# Patient Record
Sex: MALE | Race: WHITE | NOT HISPANIC OR LATINO
[De-identification: names, ages, dates, MRNs, and addresses within clinical notes are randomized per-mention and may not be internally consistent; named-entity substitution may affect disease eponyms.]

---
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
## 2024-02-01 ENCOUNTER — OFFICE VISIT (OUTPATIENT)
Age: 19
End: 2024-02-01
Payer: COMMERCIAL

## 2024-02-01 VITALS
BODY MASS INDEX: 28.08 KG/M2 | OXYGEN SATURATION: 99 % | RESPIRATION RATE: 16 BRPM | DIASTOLIC BLOOD PRESSURE: 72 MMHG | HEART RATE: 76 BPM | WEIGHT: 200.6 LBS | SYSTOLIC BLOOD PRESSURE: 120 MMHG | TEMPERATURE: 98 F | HEIGHT: 71 IN

## 2024-02-01 DIAGNOSIS — L73.9 FOLLICULITIS: Primary | ICD-10-CM

## 2024-02-01 PROCEDURE — 99213 OFFICE O/P EST LOW 20 MIN: CPT | Performed by: INTERNAL MEDICINE

## 2024-02-01 RX ORDER — LAMOTRIGINE 25 MG/1
TABLET ORAL
COMMUNITY
Start: 2024-01-30 | End: 2024-02-01

## 2024-02-01 RX ORDER — SULFAMETHOXAZOLE AND TRIMETHOPRIM 800; 160 MG/1; MG/1
1 TABLET ORAL 2 TIMES DAILY
Qty: 20 TABLET | Refills: 0 | Status: SHIPPED | OUTPATIENT
Start: 2024-02-01 | End: 2024-02-11

## 2024-02-01 NOTE — PROGRESS NOTES
INTERNAL MEDICINE FOLLOW-UP OFFICE VISIT  [facility]    NAME: Dave Estrada  AGE: 18 y.o. SEX: male  : 2005   MRN: [id number]    DATE: 2024  TIME: 4:52 PM    Assessment and Plan     1. Folliculitis  Was advised to take ibuprofen or Tylenol for pain relief.  He will call me back if his symptoms do not improve.    - sulfamethoxazole-trimethoprim (BACTRIM DS) 800-160 mg per tablet; Take 1 tablet by mouth 2 (two) times a day for 10 days  Dispense: 20 tablet; Refill: 0    - Counseling Documentation: patient was counseled regarding: instructions for management, risk factor reductions, prognosis, patient and family education, risks and benefits of treatment options, and importance of compliance with treatment  - Medication Side Effects: Adverse side effects of medications were reviewed with the patient/guardian today.    Return to office in: As needed    Chief Complaint     Chief Complaint   Patient presents with    Recurrent Skin Infections     After shaving         History of Present Illness     HPI  Has a boil in his groin area for the last few days.  It happened after shaving.  The size of the boil is increasing.  There is some pain in it.    The following portions of the patient's history were reviewed and updated as appropriate: allergies, current medications, past family history, past medical history, past social history, past surgical history and problem list.    Review of Systems     Review of Systems   Constitutional:  Negative for chills, diaphoresis, fatigue and fever.   HENT:  Negative for congestion, ear discharge, ear pain, hearing loss, postnasal drip, rhinorrhea, sinus pressure, sinus pain, sneezing, sore throat and voice change.    Eyes:  Negative for pain, discharge, redness and visual disturbance.   Respiratory:  Negative for cough, chest tightness, shortness of breath and wheezing.    Cardiovascular:  Negative for chest pain, palpitations and leg  "swelling.   Gastrointestinal:  Negative for abdominal distention, abdominal pain, blood in stool, constipation, diarrhea, nausea and vomiting.   Endocrine: Negative for cold intolerance, heat intolerance, polydipsia, polyphagia and polyuria.   Genitourinary:  Negative for dysuria, flank pain, frequency, hematuria and urgency.   Musculoskeletal:  Negative for arthralgias, back pain, gait problem, joint swelling, myalgias, neck pain and neck stiffness.   Skin:  Positive for wound. Negative for rash.   Neurological:  Negative for dizziness, tremors, syncope, facial asymmetry, speech difficulty, weakness, light-headedness, numbness and headaches.   Hematological:  Does not bruise/bleed easily.   Psychiatric/Behavioral:  Negative for behavioral problems, confusion and sleep disturbance. The patient is not nervous/anxious.        Active Problem List     Patient Active Problem List   Diagnosis    Recurrent major depressive disorder, in partial remission (HCC)    Generalized anxiety disorder    PTSD (post-traumatic stress disorder)    Overweight    SOB (shortness of breath) on exertion    Coccydynia    Low left ventricular ejection fraction       Objective     /72 (BP Location: Left arm, Patient Position: Sitting, Cuff Size: Standard)   Pulse 76   Temp 98 °F (36.7 °C) (Tympanic)   Resp 16   Ht 5' 11\" (1.803 m)   Wt 91 kg (200 lb 9.6 oz)   SpO2 99%   BMI 27.98 kg/m²     Physical Exam  Constitutional:       General: He is not in acute distress.     Appearance: He is well-developed. He is not diaphoretic.   HENT:      Head: Normocephalic and atraumatic.      Right Ear: External ear normal.      Left Ear: External ear normal.      Nose: Nose normal.   Eyes:      General: No scleral icterus.        Right eye: No discharge.         Left eye: No discharge.      Conjunctiva/sclera: Conjunctivae normal.   Neck:      Thyroid: No thyromegaly.      Vascular: No JVD.      Trachea: No tracheal deviation.   Cardiovascular:    "   Rate and Rhythm: Normal rate and regular rhythm.      Heart sounds: Normal heart sounds. No murmur heard.     No friction rub. No gallop.   Pulmonary:      Effort: Pulmonary effort is normal. No respiratory distress.      Breath sounds: Normal breath sounds. No wheezing or rales.   Chest:      Chest wall: No tenderness.   Abdominal:      General: Bowel sounds are normal. There is no distension.      Palpations: Abdomen is soft.      Tenderness: There is no abdominal tenderness. There is no guarding or rebound.   Musculoskeletal:         General: No tenderness. Normal range of motion.      Cervical back: Normal range of motion and neck supple.   Lymphadenopathy:      Cervical: No cervical adenopathy.   Skin:     General: Skin is warm and dry.      Findings: Erythema present. No rash.   Neurological:      Mental Status: He is alert and oriented to person, place, and time.      Cranial Nerves: No cranial nerve deficit.      Motor: No abnormal muscle tone.      Coordination: Coordination normal.   Psychiatric:         Judgment: Judgment normal.         Pertinent Laboratory/Diagnostic Studies:        Current Medications       Current Outpatient Medications:     busPIRone (BUSPAR) 15 mg tablet, Take 15 mg by mouth 2 (two) times a day, Disp: , Rfl:     Cholecalciferol (Vitamin D) 50 MCG (2000 UT) tablet, Take 2,000 Units by mouth daily, Disp: , Rfl:     escitalopram (LEXAPRO) 20 mg tablet, Take 20 mg by mouth daily, Disp: , Rfl:     hydrOXYzine HCL (ATARAX) 25 mg tablet, Take 25 mg by mouth daily as needed, Disp: , Rfl:     lisinopril (ZESTRIL) 2.5 mg tablet, TAKE 1 TABLET BY MOUTH EVERY DAY IN THE EVENING, Disp: 90 tablet, Rfl: 3    metoprolol succinate (TOPROL-XL) 25 mg 24 hr tablet, TAKE 1 TABLET (25 MG TOTAL) BY MOUTH DAILY., Disp: 90 tablet, Rfl: 2    sulfamethoxazole-trimethoprim (BACTRIM DS) 800-160 mg per tablet, Take 1 tablet by mouth 2 (two) times a day for 10 days, Disp: 20 tablet, Rfl: 0    Health Maintenance      Health Maintenance   Topic Date Due    COVID-19 Vaccine (1) Never done    Pneumococcal Vaccine: Pediatrics (0 to 5 Years) and At-Risk Patients (6 to 64 Years) (1 - PCV) Never done    DTaP,Tdap,and Td Vaccines (1 - Tdap) Never done    HPV Vaccine (1 - Male 2-dose series) Never done    Annual Physical  Never done    Hepatitis A Vaccine (1 of 2 - Risk 2-dose series) Never done    Depression Screening  08/03/2023    Influenza Vaccine (1) Never done    Zoster Vaccine (1 of 2) 02/24/2054    HIV Screening  Completed    Hepatitis C Screening  Completed    HIB Vaccine  Aged Out    IPV Vaccine  Aged Out    Meningococcal ACWY Vaccine  Aged Out       There is no immunization history on file for this patient.      Jami Castillo MD  [facility]

## 2024-02-09 ENCOUNTER — TELEPHONE (OUTPATIENT)
Age: 19
End: 2024-02-09

## 2024-02-09 NOTE — TELEPHONE ENCOUNTER
SAW    NADEEM    LAST  WEEK     FOR  BOIL   ON  HIS   LEG   GAVE  HIM  ANTIBIOTIC    BUT   NOT  ANY  BETTER   WOULD  LIKE  TO  HAVE  IT   TAKEN  CARE  OF   WHAT  SHOULD  HE  DO  NEXT

## 2024-02-13 ENCOUNTER — TELEPHONE (OUTPATIENT)
Age: 19
End: 2024-02-13

## 2024-02-13 NOTE — TELEPHONE ENCOUNTER
You gave antibiotic on 02/01 bactrum patient has taken approximately half the course - he was not faithful but has now developed rash   Please advise

## 2024-02-14 NOTE — TELEPHONE ENCOUNTER
Spoke to patient . He was told to stop taking the antibiotic and if rash does not go away in a few days he is to call back because then it is another issue and he will have to be seen

## 2024-03-04 ENCOUNTER — OFFICE VISIT (OUTPATIENT)
Age: 19
End: 2024-03-04
Payer: COMMERCIAL

## 2024-03-04 VITALS
HEIGHT: 71 IN | DIASTOLIC BLOOD PRESSURE: 70 MMHG | TEMPERATURE: 96.8 F | OXYGEN SATURATION: 98 % | BODY MASS INDEX: 28.42 KG/M2 | SYSTOLIC BLOOD PRESSURE: 124 MMHG | HEART RATE: 87 BPM | WEIGHT: 203 LBS

## 2024-03-04 DIAGNOSIS — L02.92 FURUNCLE: Primary | ICD-10-CM

## 2024-03-04 PROCEDURE — 99213 OFFICE O/P EST LOW 20 MIN: CPT

## 2024-03-04 RX ORDER — DOXYCYCLINE HYCLATE 100 MG
100 TABLET ORAL 2 TIMES DAILY
Qty: 10 TABLET | Refills: 0 | Status: SHIPPED | OUTPATIENT
Start: 2024-03-04 | End: 2024-03-09

## 2024-03-04 NOTE — ASSESSMENT & PLAN NOTE
May add warm compress as needed for continued drainage. Furuncle appeared to already be drained and was not painful any more. Old photos were compared and redness surrounding it resolved. Pt in agreement to leave alone and do a short course of antibiotics after not finishing Bactrim due to rash. Risks of recurrence discussed and encouraged to keep dry. Reviewed LIZABETH of sunburns, N/V/D.

## 2024-03-04 NOTE — PROGRESS NOTES
"Name: Dave Estrada      : 2005      MRN: [id number]  Encounter Provider: Adrian Cowart PA-C  Encounter Date: 3/4/2024   Encounter department: [facility] [place]    Assessment & Plan     1. Furuncle  Assessment & Plan:  May add warm compress as needed for continued drainage. Furuncle appeared to already be drained and was not painful any more. Old photos were compared and redness surrounding it resolved. Pt in agreement to leave alone and do a short course of antibiotics after not finishing Bactrim due to rash. Risks of recurrence discussed and encouraged to keep dry. Reviewed LIZABETH of sunburns, N/V/D.    Orders:  -     doxycycline hyclate (VIBRA-TABS) 100 mg tablet; Take 1 tablet (100 mg total) by mouth 2 (two) times a day for 5 days           Subjective      HPI patient is a 19-year-old male presenting with a \"boil\" in the left groin area that has been there for about 2 months. Started when he was shaving and initially looked like a small \"pimple.\"  It used to be painful and a lot more raised but has since come down.  Denies ever remembering it draining but admits it is definitely improved.  Old photos were shown and patient says the redness and pain resolved after doing the Bactrim for about for 5 days before developing a rash and discontinuing it.  He has never had one of these before.  Did not develop a fever and drug rash resolved.  Review of Systems   Constitutional:  Negative for activity change, appetite change, chills, fatigue and fever.   Respiratory:  Negative for shortness of breath.    Cardiovascular:  Negative for chest pain.   Musculoskeletal:  Negative for gait problem.   Skin:  Positive for color change (resolved) and wound (\"boil\" crusted over at opening). Negative for rash.       Current Outpatient Medications on File Prior to Visit   Medication Sig    busPIRone (BUSPAR) 15 mg tablet Take 15 mg by mouth 2 (two) times a day    Cholecalciferol (Vitamin D) 50 " "MCG (2000 UT) tablet Take 2,000 Units by mouth daily    escitalopram (LEXAPRO) 20 mg tablet Take 20 mg by mouth daily    hydrOXYzine HCL (ATARAX) 25 mg tablet Take 25 mg by mouth daily as needed    lisinopril (ZESTRIL) 2.5 mg tablet TAKE 1 TABLET BY MOUTH EVERY DAY IN THE EVENING    metoprolol succinate (TOPROL-XL) 25 mg 24 hr tablet TAKE 1 TABLET (25 MG TOTAL) BY MOUTH DAILY.       Objective     /70   Pulse 87   Temp (!) 96.8 °F (36 °C)   Ht 5' 11\" (1.803 m)   Wt 92.1 kg (203 lb)   SpO2 98%   BMI 28.31 kg/m²     Physical Exam  Constitutional:       General: He is not in acute distress.     Appearance: Normal appearance. He is not ill-appearing, toxic-appearing or diaphoretic.   HENT:      Head: Normocephalic and atraumatic.   Eyes:      Extraocular Movements: Extraocular movements intact.      Conjunctiva/sclera: Conjunctivae normal.      Pupils: Pupils are equal, round, and reactive to light.   Skin:     Coloration: Skin is not ashen, cyanotic, jaundiced, mottled, pale or sallow.      Findings: Abscess (about 3 cm, appeared to be drained with crusting at the opening. not painful to palpation and manipulation.) present. No bruising, ecchymosis, erythema, signs of injury or rash. Rash is not purpuric, scaling, urticarial or vesicular.             Comments: See image for location. Slight purulent discharge was expressed through the crusted duct upon palpation. Followed by some blood.    Neurological:      Mental Status: He is alert.       Adrian Cowart PA-C    "

## 2024-04-11 DIAGNOSIS — I49.3 PVCS (PREMATURE VENTRICULAR CONTRACTIONS): ICD-10-CM

## 2024-04-11 DIAGNOSIS — I42.9 CARDIOMYOPATHY, UNSPECIFIED TYPE (HCC): ICD-10-CM

## 2024-04-11 RX ORDER — METOPROLOL SUCCINATE 25 MG/1
25 TABLET, EXTENDED RELEASE ORAL DAILY
Qty: 90 TABLET | Refills: 1 | Status: SHIPPED | OUTPATIENT
Start: 2024-04-11

## 2024-05-06 ENCOUNTER — OFFICE VISIT (OUTPATIENT)
Age: 19
End: 2024-05-06
Payer: COMMERCIAL

## 2024-05-06 VITALS
OXYGEN SATURATION: 99 % | HEART RATE: 86 BPM | BODY MASS INDEX: 29.4 KG/M2 | HEIGHT: 71 IN | DIASTOLIC BLOOD PRESSURE: 60 MMHG | WEIGHT: 210 LBS | RESPIRATION RATE: 16 BRPM | SYSTOLIC BLOOD PRESSURE: 114 MMHG | TEMPERATURE: 97.8 F

## 2024-05-06 DIAGNOSIS — L98.9 SKIN LESIONS: Primary | ICD-10-CM

## 2024-05-06 PROCEDURE — 99213 OFFICE O/P EST LOW 20 MIN: CPT | Performed by: INTERNAL MEDICINE

## 2024-05-06 RX ORDER — ARIPIPRAZOLE 5 MG/1
5 TABLET ORAL
COMMUNITY
Start: 2024-04-27

## 2024-05-06 RX ORDER — BUSPIRONE HYDROCHLORIDE 30 MG/1
30 TABLET ORAL 2 TIMES DAILY
COMMUNITY
Start: 2024-05-01

## 2024-05-06 NOTE — PROGRESS NOTES
Has skin lesions INTERNAL MEDICINE FOLLOW-UP OFFICE VISIT  [facility]    NAME: Dave Estrada  AGE: 19 y.o. SEX: male  : 2005   MRN: [id number]    DATE: 2024  TIME: 3:41 PM    Assessment and Plan     1. Skin lesions  Has skin lesions on his toes which need to be seen by the dermatologist.  Referral was given.    - Ambulatory Referral to Dermatology; Future    - Counseling Documentation: patient was counseled regarding: instructions for management, risk factor reductions, prognosis, patient and family education, risks and benefits of treatment options, and importance of compliance with treatment  - Medication Side Effects: Adverse side effects of medications were reviewed with the patient/guardian today.    Return to office in: as needed    Chief Complaint     Chief Complaint   Patient presents with    Mass     Growth on bilateral toes        History of Present Illness     HPI  Patient is here for skin lesions on his toes.  There is no tenderness or redness around the lesions.    The following portions of the patient's history were reviewed and updated as appropriate: allergies, current medications, past family history, past medical history, past social history, past surgical history and problem list.    Review of Systems     Review of Systems   Constitutional:  Negative for chills, diaphoresis, fatigue and fever.   HENT:  Negative for congestion, ear discharge, ear pain, hearing loss, postnasal drip, rhinorrhea, sinus pressure, sinus pain, sneezing, sore throat and voice change.    Eyes:  Negative for pain, discharge, redness and visual disturbance.   Respiratory:  Negative for cough, chest tightness, shortness of breath and wheezing.    Cardiovascular:  Negative for chest pain, palpitations and leg swelling.   Gastrointestinal:  Negative for abdominal distention, abdominal pain, blood in stool, constipation, diarrhea, nausea and vomiting.   Endocrine: Negative for  "cold intolerance, heat intolerance, polydipsia, polyphagia and polyuria.   Genitourinary:  Negative for dysuria, flank pain, frequency, hematuria and urgency.   Musculoskeletal:  Negative for arthralgias, back pain, gait problem, joint swelling, myalgias, neck pain and neck stiffness.   Skin:  Negative for rash.   Neurological:  Negative for dizziness, tremors, syncope, facial asymmetry, speech difficulty, weakness, light-headedness, numbness and headaches.   Hematological:  Does not bruise/bleed easily.   Psychiatric/Behavioral:  Negative for behavioral problems, confusion and sleep disturbance. The patient is not nervous/anxious.        Active Problem List     Patient Active Problem List   Diagnosis    Recurrent major depressive disorder, in partial remission (HCC)    Generalized anxiety disorder    PTSD (post-traumatic stress disorder)    Overweight    SOB (shortness of breath) on exertion    Coccydynia    Low left ventricular ejection fraction    Furuncle       Objective     /60 (BP Location: Left arm, Patient Position: Sitting, Cuff Size: Standard)   Pulse 86   Temp 97.8 °F (36.6 °C) (Tympanic)   Resp 16   Ht 5' 11\" (1.803 m)   Wt 95.3 kg (210 lb)   SpO2 99%   BMI 29.29 kg/m²     Physical Exam  Constitutional:       General: He is not in acute distress.     Appearance: He is well-developed. He is not diaphoretic.   HENT:      Head: Normocephalic and atraumatic.      Right Ear: External ear normal.      Left Ear: External ear normal.      Nose: Nose normal.   Eyes:      General: No scleral icterus.        Right eye: No discharge.         Left eye: No discharge.      Conjunctiva/sclera: Conjunctivae normal.   Neck:      Thyroid: No thyromegaly.      Vascular: No JVD.      Trachea: No tracheal deviation.   Cardiovascular:      Rate and Rhythm: Normal rate and regular rhythm.      Heart sounds: Normal heart sounds. No murmur heard.     No friction rub. No gallop.   Pulmonary:      Effort: Pulmonary " effort is normal. No respiratory distress.      Breath sounds: Normal breath sounds. No wheezing or rales.   Chest:      Chest wall: No tenderness.   Abdominal:      General: Bowel sounds are normal. There is no distension.      Palpations: Abdomen is soft.      Tenderness: There is no abdominal tenderness. There is no guarding or rebound.   Musculoskeletal:         General: No tenderness. Normal range of motion.      Cervical back: Normal range of motion and neck supple.   Lymphadenopathy:      Cervical: No cervical adenopathy.   Skin:     General: Skin is warm and dry.      Findings: No erythema or rash.      Comments: 0.5 cm lesions on both great toes.  No erythema or tenderness   Neurological:      Mental Status: He is alert and oriented to person, place, and time.      Cranial Nerves: No cranial nerve deficit.      Motor: No abnormal muscle tone.      Coordination: Coordination normal.   Psychiatric:         Judgment: Judgment normal.         Pertinent Laboratory/Diagnostic Studies:        Current Medications       Current Outpatient Medications:     ARIPiprazole (ABILIFY) 5 mg tablet, Take 5 mg by mouth daily at bedtime, Disp: , Rfl:     busPIRone (BUSPAR) 30 MG tablet, Take 30 mg by mouth 2 (two) times a day, Disp: , Rfl:     Cholecalciferol (Vitamin D) 50 MCG (2000 UT) tablet, Take 2,000 Units by mouth daily, Disp: , Rfl:     escitalopram (LEXAPRO) 20 mg tablet, Take 20 mg by mouth daily, Disp: , Rfl:     hydrOXYzine HCL (ATARAX) 25 mg tablet, Take 25 mg by mouth daily as needed, Disp: , Rfl:     lisinopril (ZESTRIL) 2.5 mg tablet, TAKE 1 TABLET BY MOUTH EVERY DAY IN THE EVENING, Disp: 90 tablet, Rfl: 3    metoprolol succinate (TOPROL-XL) 25 mg 24 hr tablet, TAKE 1 TABLET (25 MG TOTAL) BY MOUTH DAILY., Disp: 90 tablet, Rfl: 1    Health Maintenance     Health Maintenance   Topic Date Due    Pneumococcal Vaccine: Pediatrics (0 to 5 Years) and At-Risk Patients (6 to 64 Years) (1 of 2 - PCV) Never done     DTaP,Tdap,and Td Vaccines (1 - Tdap) Never done    HPV Vaccine (1 - Male 3-dose series) Never done    Annual Physical  Never done    Hepatitis A Vaccine (1 of 2 - Risk 2-dose series) Never done    COVID-19 Vaccine (1 - 2023-24 season) Never done    Influenza Vaccine (Season Ended) 09/01/2024    Depression Screening  02/01/2025    Depression Follow-up Plan  02/01/2025    Zoster Vaccine (1 of 2) 02/24/2054    HIV Screening  Completed    Hepatitis C Screening  Completed    HIB Vaccine  Aged Out    IPV Vaccine  Aged Out    Meningococcal ACWY Vaccine  Aged Out       There is no immunization history on file for this patient.      Jami Castillo MD  [facility]

## 2024-05-07 ENCOUNTER — OFFICE VISIT (OUTPATIENT)
Age: 19
End: 2024-05-07
Payer: COMMERCIAL

## 2024-05-07 VITALS — TEMPERATURE: 98.2 F | WEIGHT: 212 LBS | HEIGHT: 71 IN | BODY MASS INDEX: 29.68 KG/M2

## 2024-05-07 DIAGNOSIS — D22.9 MULTIPLE MELANOCYTIC NEVI: ICD-10-CM

## 2024-05-07 DIAGNOSIS — L98.9 SKIN LESIONS: ICD-10-CM

## 2024-05-07 DIAGNOSIS — M72.1 KNUCKLE PADS: Primary | ICD-10-CM

## 2024-05-07 PROCEDURE — 99203 OFFICE O/P NEW LOW 30 MIN: CPT | Performed by: DERMATOLOGY

## 2024-05-07 NOTE — PROGRESS NOTES
"[facility] Dermatology Clinic Note     Patient Name: Dave Estrada  Encounter Date: 5/7/2024    Have you been cared for by a [facility] Dermatologist in the last 3 years and, if so, which description applies to you?    NO.   I am considered a \"new\" patient and must complete all patient intake questions. I am MALE/not capable of bearing children.    REVIEW OF SYSTEMS:  Have you recently had or currently have any of the following? Recent fever or chills? No  Any non-healing wound? No   PAST MEDICAL HISTORY:  Have you personally ever had or currently have any of the following?  If \"YES,\" then please provide more detail. Skin cancer (such as Melanoma, Basal Cell Carcinoma, Squamous Cell Carcinoma?  No  Tuberculosis, HIV/AIDS, Hepatitis B or C: No  Radiation Treatment No   HISTORY OF IMMUNOSUPPRESSION:   Do you have a history of any of the following:  Systemic Immunosuppression such as Diabetes, Biologic or Immunotherapy, Chemotherapy, Organ Transplantation, Bone Marrow Transplantation?  No     Answering \"YES\" requires the addition of the dotphrase \"IMMUNOSUPPRESSED\" as the first diagnosis of the patient's visit.   FAMILY HISTORY:  Any \"first degree relatives\" (parent, brother, sister, or child) with the following?    Skin Cancer, Pancreatic or Other Cancer? No   PATIENT EXPERIENCE:    Do you want the Dermatologist to perform a COMPLETE skin exam today including a clinical examination under the \"bra and underwear\" areas?  Yes  If necessary, do we have your permission to call and leave a detailed message on your Preferred Phone number that includes your specific medical information?  Yes      Allergies   Allergen Reactions    Bactrim [Sulfamethoxazole-Trimethoprim] Rash      Current Outpatient Medications:     ARIPiprazole (ABILIFY) 5 mg tablet, Take 5 mg by mouth daily at bedtime, Disp: , Rfl:     busPIRone (BUSPAR) 30 MG tablet, Take 30 mg by mouth 2 (two) times a day, Disp: , Rfl:     escitalopram (LEXAPRO) 20 mg " "tablet, Take 20 mg by mouth daily, Disp: , Rfl:     hydrOXYzine HCL (ATARAX) 25 mg tablet, Take 25 mg by mouth daily as needed, Disp: , Rfl:     lisinopril (ZESTRIL) 2.5 mg tablet, TAKE 1 TABLET BY MOUTH EVERY DAY IN THE EVENING, Disp: 90 tablet, Rfl: 3    metoprolol succinate (TOPROL-XL) 25 mg 24 hr tablet, TAKE 1 TABLET (25 MG TOTAL) BY MOUTH DAILY., Disp: 90 tablet, Rfl: 1    Cholecalciferol (Vitamin D) 50 MCG (2000 UT) tablet, Take 2,000 Units by mouth daily (Patient not taking: Reported on 5/7/2024), Disp: , Rfl:           Whom besides the patient is providing clinical information about today's encounter?   NO ADDITIONAL HISTORIAN (patient alone provided history)    Physical Exam and Assessment/Plan by Diagnosis:    CHIEF COMPLAINT    19 year old male patient presents today for New Patient.  Patient has a No history of skin cancer    Knuckle pads   Physical Exam:  Anatomic Location Affected:  proximal joint of the great toes bilateral   Morphological Description:  hyperkeratotic nodules   Pertinent Positives:  Pertinent Negatives:    Additional History of Present Condition:  Patient here for concern of growth on bilateral great toe.     Assessment and Plan:  Based on a thorough discussion of this condition and the management approach to it (including a comprehensive discussion of the known risks, side effects and potential benefits of treatment), the patient (family) agrees to implement the following specific plan:  Can apply 40% urea cream. Can buy on amazon     MELANOCYTIC NEVI (\"Moles\")    Physical Exam:  Anatomic Location Affected: Mostly on sun-exposed areas of the trunk and extremities   Morphological Description:  Scattered, 1-4mm round to ovoid, symmetrical-appearing, even bordered, skin colored to dark brown macules/papules, mostly in sun-exposed areas  Pertinent Positives:  Pertinent Negatives:    Additional History of Present Condition:  Present on exam     Assessment and Plan:  Based on a thorough " discussion of this condition and the management approach to it (including a comprehensive discussion of the known risks, side effects and potential benefits of treatment), the patient (family) agrees to implement the following specific plan:  Provided handout with information regarding the ABCDE's of moles   Recommend routine skin exams every year   Sun avoidance, protective clothing (known as UPF clothing), and the use of at least SPF 30 sunscreens is advised. Sunscreen should be reapplied every two hours when outside.           Scribe Attestation      I,:  Ange Sanchez MA am acting as a scribe while in the presence of the attending physician.:       I,:  Joseph Correa MD personally performed the services described in this documentation    as scribed in my presence.:

## 2024-05-07 NOTE — PATIENT INSTRUCTIONS
"MELANOCYTIC NEVI (\"Moles\")    Melanocytic nevi (\"moles\") are tan or brown, raised or flat areas of the skin which have an increased number of melanocytes. Melanocytes are the cells in our body which make pigment and account for skin color.    Some moles are present at birth (I.e., \"congenital nevi\"), while others come up later in life (i.e., \"acquired nevi\").  The sun can stimulate the body to make more moles.  Sunburns are not the only thing that triggers more moles.  Chronic sun exposure can do it too.     Clinically distinguishing a healthy mole from melanoma may be difficult, even for experienced dermatologists. The \"ABCDE's\" of moles have been suggested as a means of helping to alert a person to a suspicious mole and the possible increased risk of melanoma.  The suggestions for raising alert are as follows:    Asymmetry: Healthy moles tend to be symmetric, while melanomas are often asymmetric.  Asymmetry means if you draw a line through the mole, the two halves do not match in color, size, shape, or surface texture. Asymmetry can be a result of rapid enlargement of a mole, the development of a raised area on a previously flat lesion, scaling, ulceration, bleeding or scabbing within the mole.  Any mole that starts to demonstrate \"asymmetry\" should be examined promptly by a board certified dermatologist.     Border: Healthy moles tend to have discrete, even borders.  The border of a melanoma often blends into the normal skin and does not sharply delineate the mole from normal skin.  Any mole that starts to demonstrate \"uneven borders\" should be examined promptly by a board certified dermatologist.     Color: Healthy moles tend to be one color throughout.  Melanomas tend to be made up of different colors ranging from dark black, blue, white, or red.  Any mole that demonstrates a color change should be examined promptly by a board certified dermatologist.     Diameter: Healthy moles tend to be smaller than 0.6 cm " "in size; an exception are \"congenital nevi\" that can be larger.  Melanomas tend to grow and can often be greater than 0.6 cm (1/4 of an inch, or the size of a pencil eraser). This is only a guideline, and many normal moles may be larger than 0.6 cm without being unhealthy.  Any mole that starts to change in size (small to bigger or bigger to smaller) should be examined promptly by a board certified dermatologist.     Evolving: Healthy moles tend to \"stay the same.\"  Melanomas may often show signs of change or evolution such as a change in size, shape, color, or elevation.  Any mole that starts to itch, bleed, crust, burn, hurt, or ulcerate or demonstrate a change or evolution should be examined promptly by a board certified dermatologist.      Dysplastic Nevi  Dysplastic moles are moles that fit the ABCDE rules of melanoma but are not identified as melanomas when examined under the microscope.  They may indicate an increased risk of melanoma in that person. If there is a family history of melanoma, most experts agree that the person may be at an increased risk for developing a melanoma.  Experts still do not agree on what dysplastic moles mean in patients without a personal or family history of melanoma.  Dysplastic moles are usually larger than common moles and have different colors within it with irregular borders. The appearance can be very similar to a melanoma. Biopsies of dysplastic moles may show abnormalities which are different from a regular mole.      Melanoma  Malignant melanoma is a type of skin cancer that can be deadly if it spreads throughout the body. The incidence of melanoma in the [place] is growing faster than any other cancer. Melanoma usually grows near the surface of the skin for a period of time, and then begins to grow deeper into the skin. Once it grows deeper into the skin, the risk of spread to other organs greatly increases. Therefore, early detection and removal of a malignant " "melanoma may result in a better chance at a complete cure; removal after the tumor has spread may not be as effective, leading to worse clinical outcomes such as death.    The true rate of nevus transformation into a melanoma is unknown. It has been estimated that the lifetime risk for any acquired melanocytic nevus on any 20-year-old individual transforming into melanoma by age 80 is 0.03% (1 in 3,164) for men and 0.009% (1 in 10,800) for women.     The appearance of a \"new mole\" remains one of the most reliable methods for identifying a malignant melanoma.  Occasionally, melanomas appear as rapidly growing, blue-black, dome-shaped bumps within a previous mole or previous area of normal skin.  Other times, melanomas are suspected when a mole suddenly appears or changes. Itching, burning, or pain in a pigmented lesion should increase suspicion, but most patients with early melanoma have no skin discomfort whatsoever.  Melanoma can occur anywhere on the skin, including areas that are difficult for self-examination. Many melanomas are first noticed by other family members.  Suspicious-looking moles may be removed for microscopic examination.       You may be able to prevent death from melanoma by doing two simple things:    Try to avoid unnecessary sun exposure and protect your skin when it is exposed to the sun.  People who live near the equator, people who have intermittent exposures to large amounts of sun, and people who have had sunburns in childhood or adolescence have an increased risk for melanoma. Sun sense and vigilant sun protection may be keys to helping to prevent melanoma.  We recommend wearing UPF-rated sun protective clothing and sunglasses whenever possible and applying a moisturizer-sunscreen combination product (SPF 50+) such as Neutrogena Daily Defense to sun exposed areas of skin at least three times a day.    Have your moles regularly examined by a board certified dermatologist AND by yourself or " "a family member/friend at home.  We recommend that you have your moles examined at least once a year by a board certified dermatologist.  Use your birthday as an annual reminder to have your \"Birthday Suit\" (I.e., your skin) examined; it is a nice birthday gift to yourself to know that your skin is healthy appearing!  Additionally, at-home self examinations may be helpful for detecting a possible melanoma.  Use the ABCDEs we discussed and check your moles once a month at home.       Assessment and Plan:  Based on a thorough discussion of this condition and the management approach to it (including a comprehensive discussion of the known risks, side effects and potential benefits of treatment), the patient (family) agrees to implement the following specific plan:  Can apply 40% urea cream. Can buy on amazon     "

## 2024-07-12 DIAGNOSIS — I49.3 PVCS (PREMATURE VENTRICULAR CONTRACTIONS): ICD-10-CM

## 2024-07-12 DIAGNOSIS — I42.9 CARDIOMYOPATHY, UNSPECIFIED TYPE (HCC): ICD-10-CM

## 2024-07-12 RX ORDER — METOPROLOL SUCCINATE 25 MG/1
25 TABLET, EXTENDED RELEASE ORAL DAILY
Qty: 90 TABLET | Refills: 1 | Status: SHIPPED | OUTPATIENT
Start: 2024-07-12

## 2024-08-12 ENCOUNTER — TELEPHONE (OUTPATIENT)
Age: 19
End: 2024-08-12

## 2024-08-12 NOTE — TELEPHONE ENCOUNTER
Contacted patient off of [facility]  to verify needs of services in attempts to offer patient an appointment. spoke with patient whom stated  they are still interested in services. Writer asked if patient willing to travel to scheduling locations which they stated they are unsure and would like to contact office back.

## 2024-10-17 ENCOUNTER — OFFICE VISIT (OUTPATIENT)
Age: 19
End: 2024-10-17
Payer: COMMERCIAL

## 2024-10-17 ENCOUNTER — APPOINTMENT (OUTPATIENT)
Age: 19
End: 2024-10-17
Payer: COMMERCIAL

## 2024-10-17 VITALS
HEART RATE: 113 BPM | OXYGEN SATURATION: 98 % | DIASTOLIC BLOOD PRESSURE: 76 MMHG | SYSTOLIC BLOOD PRESSURE: 124 MMHG | RESPIRATION RATE: 18 BRPM | TEMPERATURE: 98 F | BODY MASS INDEX: 28.56 KG/M2 | HEIGHT: 71 IN | WEIGHT: 204 LBS

## 2024-10-17 DIAGNOSIS — Z23 NEED FOR VACCINATION: ICD-10-CM

## 2024-10-17 DIAGNOSIS — M53.3 COCCYDYNIA: ICD-10-CM

## 2024-10-17 DIAGNOSIS — M53.3 COCCYDYNIA: Primary | ICD-10-CM

## 2024-10-17 PROCEDURE — 90471 IMMUNIZATION ADMIN: CPT | Performed by: STUDENT IN AN ORGANIZED HEALTH CARE EDUCATION/TRAINING PROGRAM

## 2024-10-17 PROCEDURE — 72220 X-RAY EXAM SACRUM TAILBONE: CPT

## 2024-10-17 PROCEDURE — 99213 OFFICE O/P EST LOW 20 MIN: CPT | Performed by: STUDENT IN AN ORGANIZED HEALTH CARE EDUCATION/TRAINING PROGRAM

## 2024-10-17 PROCEDURE — 90656 IIV3 VACC NO PRSV 0.5 ML IM: CPT | Performed by: STUDENT IN AN ORGANIZED HEALTH CARE EDUCATION/TRAINING PROGRAM

## 2024-10-17 RX ORDER — NAPROXEN 500 MG/1
500 TABLET ORAL 2 TIMES DAILY WITH MEALS
Qty: 10 TABLET | Refills: 0 | Status: SHIPPED | OUTPATIENT
Start: 2024-10-17 | End: 2024-10-22

## 2024-10-17 NOTE — ASSESSMENT & PLAN NOTE
Related to sitting on a metal railing. Is affecting his gait. Minimal relief from OTC NSAIDs.  Check XR sacrum and coccyx  Naproxen 500 mg BID PRN  Use cushion when sitting    Orders:    XR sacrum and coccyx; Future    naproxen (Naprosyn) 500 mg tablet; Take 1 tablet (500 mg total) by mouth 2 (two) times a day with meals for 5 days

## 2024-10-17 NOTE — PROGRESS NOTES
"Ambulatory Visit  Name: Dave Estrada      : 2005      MRN: [id number]  Encounter Provider: Deny Vallejo MD  Encounter Date: 10/17/2024   Encounter department: [facility] PRIMARY [facility] [place]    Assessment & Plan  Coccydynia  Related to sitting on a metal railing. Is affecting his gait. Minimal relief from OTC NSAIDs.  Check XR sacrum and coccyx  Naproxen 500 mg BID PRN  Use cushion when sitting    Orders:    XR sacrum and coccyx; Future    naproxen (Naprosyn) 500 mg tablet; Take 1 tablet (500 mg total) by mouth 2 (two) times a day with meals for 5 days    Need for vaccination    Orders:    influenza vaccine preservative-free 0.5 mL IM (Fluzone, Afluria, Fluarix, Flulaval)       History of Present Illness     Dave Estrada is a 20 yo M with PMH of MDD, CLEOPATRA, PTSD who presents today for coccydynia. About a week ago, he was sitting on a metal railing and he developed pain getting up from the railing. The pain is localized to his \"tailbone\" and is 7/10 in intensity. It has been hard to sit and the pain affects his walking. He has been sitting with a cushion with an opening in it. He has been treating with 220 mg of aleve PRN and 400 mg of ibuprofen PRN with very short-lived relief. No numbness, tingling, weakness, or pain in the lower extremities.          Review of Systems   Musculoskeletal:  Positive for back pain and gait problem.   Neurological:  Negative for weakness and numbness.     Medical History Reviewed by provider this encounter:  Meds  Problems       Past Medical History   Past Medical History:   Diagnosis Date    Allergic     Anxiety     Depression      Past Surgical History:   Procedure Laterality Date    CARDIAC CATHETERIZATION Left 10/11/2022    Procedure: Cardiac Left Heart Cath;  Surgeon: Kj Castellanos MD;  Location: [facility];  Service: Cardiology    CARDIAC CATHETERIZATION N/A 10/11/2022    Procedure: Cardiac Coronary Angiogram;  Surgeon: Kj Castellanos MD;  " Location: [facility];  Service: Cardiology     History reviewed. No pertinent family history.  Current Outpatient Medications on File Prior to Visit   Medication Sig Dispense Refill    ARIPiprazole (ABILIFY) 5 mg tablet Take 5 mg by mouth daily at bedtime      busPIRone (BUSPAR) 30 MG tablet Take 30 mg by mouth 2 (two) times a day      escitalopram (LEXAPRO) 20 mg tablet Take 20 mg by mouth daily      hydrOXYzine HCL (ATARAX) 25 mg tablet Take 25 mg by mouth daily as needed      lisinopril (ZESTRIL) 2.5 mg tablet TAKE 1 TABLET BY MOUTH EVERY DAY IN THE EVENING 90 tablet 3    metoprolol succinate (TOPROL-XL) 25 mg 24 hr tablet TAKE 1 TABLET (25 MG TOTAL) BY MOUTH DAILY. 90 tablet 1    [DISCONTINUED] Cholecalciferol (Vitamin D) 50 MCG (2000 UT) tablet Take 2,000 Units by mouth daily (Patient not taking: Reported on 5/7/2024)       No current facility-administered medications on file prior to visit.     Allergies   Allergen Reactions    Bactrim [Sulfamethoxazole-Trimethoprim] Rash      Current Outpatient Medications on File Prior to Visit   Medication Sig Dispense Refill    ARIPiprazole (ABILIFY) 5 mg tablet Take 5 mg by mouth daily at bedtime      busPIRone (BUSPAR) 30 MG tablet Take 30 mg by mouth 2 (two) times a day      escitalopram (LEXAPRO) 20 mg tablet Take 20 mg by mouth daily      hydrOXYzine HCL (ATARAX) 25 mg tablet Take 25 mg by mouth daily as needed      lisinopril (ZESTRIL) 2.5 mg tablet TAKE 1 TABLET BY MOUTH EVERY DAY IN THE EVENING 90 tablet 3    metoprolol succinate (TOPROL-XL) 25 mg 24 hr tablet TAKE 1 TABLET (25 MG TOTAL) BY MOUTH DAILY. 90 tablet 1    [DISCONTINUED] Cholecalciferol (Vitamin D) 50 MCG (2000 UT) tablet Take 2,000 Units by mouth daily (Patient not taking: Reported on 5/7/2024)       No current facility-administered medications on file prior to visit.      Social History     Tobacco Use    Smoking status: Former     Current packs/day: 1.00     Average packs/day: 1 pack/day for  "4.0 years (4.0 ttl pk-yrs)     Types: Cigarettes, Pipe, Cigars    Smokeless tobacco: Former     Types: Snuff   Vaping Use    Vaping status: Never Used   Substance and Sexual Activity    Alcohol use: Not Currently     Alcohol/week: 2.0 standard drinks of alcohol    Drug use: Not Currently     Types: Marijuana    Sexual activity: Not Currently     Partners: Female     Birth control/protection: Condom Male         Objective     /76 (BP Location: Left arm, Patient Position: Sitting, Cuff Size: Standard)   Pulse (!) 113   Temp 98 °F (36.7 °C) (Tympanic)   Resp 18   Ht 5' 11\" (1.803 m)   Wt 92.5 kg (204 lb)   SpO2 98%   BMI 28.45 kg/m²     Physical Exam  Constitutional:       General: He is not in acute distress.  Eyes:      Conjunctiva/sclera: Conjunctivae normal.   Cardiovascular:      Rate and Rhythm: Normal rate and regular rhythm.      Heart sounds: Normal heart sounds.   Pulmonary:      Effort: Pulmonary effort is normal.      Breath sounds: Normal breath sounds.   Musculoskeletal:      Thoracic back: No tenderness or bony tenderness.      Lumbar back: No tenderness or bony tenderness.        Back:       Right lower leg: No edema.      Left lower leg: No edema.      Comments: Tenderness to palpation midline at top of gluteal cleft, no paraspinal tenderness   Skin:     General: Skin is warm and dry.   Neurological:      Mental Status: He is alert.   Psychiatric:         Speech: Speech normal.         Behavior: Behavior normal. Behavior is cooperative.         "

## 2024-10-17 NOTE — LETTER
October 17, 2024     Patient: Dave Estrada  YOB: 2005  Date of Visit: 10/17/2024      To Whom it May Concern:    Dave Estrada is under my professional care. Dave was seen in my office on 10/17/2024. Dave may return to work on 10/21/2024 .    If you have any questions or concerns, please don't hesitate to call.         Sincerely,          Deny Vallejo MD        CC: No Recipients

## 2024-10-20 ENCOUNTER — HOSPITAL ENCOUNTER (EMERGENCY)
Facility: HOSPITAL | Age: 19
Discharge: HOME | End: 2024-10-20
Attending: EMERGENCY MEDICINE | Admitting: EMERGENCY MEDICINE
Payer: COMMERCIAL

## 2024-10-20 VITALS
TEMPERATURE: 98.4 F | RESPIRATION RATE: 16 BRPM | HEIGHT: 72 IN | SYSTOLIC BLOOD PRESSURE: 126 MMHG | HEART RATE: 98 BPM | BODY MASS INDEX: 27.63 KG/M2 | OXYGEN SATURATION: 100 % | WEIGHT: 204 LBS | DIASTOLIC BLOOD PRESSURE: 78 MMHG

## 2024-10-20 DIAGNOSIS — L05.91 PILONIDAL CYST: Primary | ICD-10-CM

## 2024-10-20 PROCEDURE — 99284 EMERGENCY DEPT VISIT MOD MDM: CPT

## 2024-10-20 PROCEDURE — 99283 EMERGENCY DEPT VISIT LOW MDM: CPT

## 2024-10-20 PROCEDURE — 10061 I&D ABSCESS COMP/MULTIPLE: CPT

## 2024-10-20 RX ORDER — OXYCODONE HYDROCHLORIDE 5 MG/1
5 TABLET ORAL EVERY 6 HOURS PRN
Qty: 4 TABLET | Refills: 0 | Status: SHIPPED | OUTPATIENT
Start: 2024-10-20

## 2024-10-20 RX ORDER — CEPHALEXIN 500 MG/1
500 CAPSULE ORAL EVERY 6 HOURS SCHEDULED
Qty: 28 CAPSULE | Refills: 0 | Status: SHIPPED | OUTPATIENT
Start: 2024-10-20 | End: 2024-10-27

## 2024-10-20 RX ORDER — OXYCODONE HYDROCHLORIDE 5 MG/1
5 TABLET ORAL ONCE
Status: COMPLETED | OUTPATIENT
Start: 2024-10-20 | End: 2024-10-20

## 2024-10-20 RX ORDER — LIDOCAINE HYDROCHLORIDE AND EPINEPHRINE 10; 10 MG/ML; UG/ML
5 INJECTION, SOLUTION INFILTRATION; PERINEURAL ONCE
Status: COMPLETED | OUTPATIENT
Start: 2024-10-20 | End: 2024-10-20

## 2024-10-20 RX ADMIN — OXYCODONE HYDROCHLORIDE 5 MG: 5 TABLET ORAL at 08:42

## 2024-10-20 RX ADMIN — CEPHALEXIN 500 MG: 250 CAPSULE ORAL at 08:43

## 2024-10-20 RX ADMIN — LIDOCAINE HYDROCHLORIDE,EPINEPHRINE BITARTRATE 5 ML: 10; .01 INJECTION, SOLUTION INFILTRATION; PERINEURAL at 08:39

## 2024-10-20 NOTE — DISCHARGE INSTRUCTIONS
Tylenol/naproxen   Oxycodone- every 6 hours as needed for severe pain; do not drive or drink alcohol on medication  Keflex-take with food  Follow up with general surgery  Return to ER if symptoms worsen

## 2024-10-20 NOTE — ED PROVIDER NOTES
"Time reflects when diagnosis was documented in both MDM as applicable and the Disposition within this note       Time User Action Codes Description Comment    10/20/2024  9:33 AM Maty Castro Add [L05.91] Pilonidal cyst           ED Disposition       ED Disposition   Discharge    Condition   Stable    Date/Time   Sun Oct 20, 2024  9:32 AM    Comment   Dave Stevegiancarlo discharge to home/self care.                   Assessment & Plan       Medical Decision Making  This patient presents with painful fluid pocket with fluctuance and surrounding induration and erythema concerning for abscess.  Abscess was anesthetized with lidocaine and was I&D was performed with the loculation and purulence was expressed.  There is no lymphatic spread visible.  Low concern for osteomyelitis.  Patient is not immunocompromise and there is no bullae, pain out of proportion or rapid progression concerning for necrotizing fasciitis.  Patient discharged home with antibiotics and advised to follow-up with primary care provider.  Return to the ER if symptoms worsens or questions or concerns arise at home.      Risk  Prescription drug management.             Medications   oxyCODONE (ROXICODONE) IR tablet 5 mg (5 mg Oral Given 10/20/24 0842)   lidocaine-epinephrine (XYLOCAINE/EPINEPHRINE) 1 %-1:100,000 injection 5 mL (5 mL Infiltration Given by Other 10/20/24 0839)   cephalexin (KEFLEX) capsule 500 mg (500 mg Oral Given 10/20/24 0843)       ED Risk Strat Scores             CRAFFT      Flowsheet Row Most Recent Value   CRAFFT Initial Screen: During the past 12 months, did you:    1. Drink any alcohol (more than a few sips)?  No Filed at: 10/20/2024 0800   2. Smoke any marijuana or hashish No Filed at: 10/20/2024 0800   3. Use anything else to get high? (\"anything else\" includes illegal drugs, over the counter and prescription drugs, and things that you sniff or 'acuña')? No Filed at: 10/20/2024 0800                                          History of " "Present Illness       Chief Complaint   Patient presents with    Back Pain     Pt c/o back pain radiating all over including tailbone x 1 week. Pt reports being seen 3 days ago, had Xray w/ no abnormal findings. Was given Naprosyn which hasn't relieved the pain. Pt also reports feeling \"a lump down low on my spine\"       Past Medical History:   Diagnosis Date    Allergic     Anxiety     Depression       Past Surgical History:   Procedure Laterality Date    CARDIAC CATHETERIZATION Left 10/11/2022    Procedure: Cardiac Left Heart Cath;  Surgeon: Kj Castellanos MD;  Location: [facility];  Service: Cardiology    CARDIAC CATHETERIZATION N/A 10/11/2022    Procedure: Cardiac Coronary Angiogram;  Surgeon: Kj Castellanos MD;  Location: [facility];  Service: Cardiology      History reviewed. No pertinent family history.   Social History     Tobacco Use    Smoking status: Former     Current packs/day: 1.00     Average packs/day: 1 pack/day for 4.0 years (4.0 ttl pk-yrs)     Types: Cigarettes, Pipe, Cigars    Smokeless tobacco: Former     Types: Snuff   Vaping Use    Vaping status: Never Used   Substance Use Topics    Alcohol use: Not Currently     Alcohol/week: 2.0 standard drinks of alcohol    Drug use: Not Currently     Types: Marijuana      E-Cigarette/Vaping    E-Cigarette Use Never User       E-Cigarette/Vaping Substances    Nicotine Yes     THC No     CBD No     Flavoring No     Other No     Unknown No       I have reviewed and agree with the history as documented.     19-year-old male patient presents to the ER for evaluation of tailbone pain.  Patient stated that he was seen and evaluated 5 days ago by his primary care provider for coccyx pain.  Patient had an x-ray which was negative.  Patient stated that he has 10 out of 10 pain and has been having no relief with naproxen.  Patient denies any injury or trauma.  No noted bowel or bladder incontinence, saddle paresthesia, immunosuppression or " history of IV drug use.  No noted fevers.  Patient does have a area of fluctuance at the top of his sacrum.  Patient denies any drainage.  There is surrounding erythema with no crepitus or lymphatic spread.          Review of Systems   Constitutional:  Negative for chills and fever.   HENT:  Negative for ear pain and sore throat.    Eyes:  Negative for pain and visual disturbance.   Respiratory:  Negative for cough and shortness of breath.    Cardiovascular:  Negative for chest pain and palpitations.   Gastrointestinal:  Negative for abdominal pain and vomiting.   Genitourinary:  Negative for dysuria and hematuria.   Musculoskeletal:  Negative for arthralgias and back pain.   Skin:  Positive for wound. Negative for color change and rash.   Neurological:  Negative for seizures and syncope.   All other systems reviewed and are negative.          Objective       ED Triage Vitals   Temperature Pulse Blood Pressure Respirations SpO2 Patient Position - Orthostatic VS   10/20/24 0756 10/20/24 0756 10/20/24 0756 10/20/24 0756 10/20/24 0756 10/20/24 0756   98 °F (36.7 °C) (!) 112 144/77 18 95 % Sitting      Temp Source Heart Rate Source BP Location FiO2 (%) Pain Score    10/20/24 0756 10/20/24 0756 10/20/24 0756 -- 10/20/24 0946    Temporal Monitor Left arm  3      Vitals      Date and Time Temp Pulse SpO2 Resp BP Pain Score FACES Pain Rating User   10/20/24 0946 98.4 °F (36.9 °C) 98 100 % 16 126/78 3 -- YY   10/20/24 0756 98 °F (36.7 °C) 112 95 % 18 144/77 -- -- RO            Physical Exam  Vitals and nursing note reviewed.   Constitutional:       General: He is not in acute distress.     Appearance: He is well-developed.   HENT:      Head: Normocephalic and atraumatic.      Right Ear: External ear normal.      Left Ear: External ear normal.   Eyes:      Conjunctiva/sclera: Conjunctivae normal.   Cardiovascular:      Rate and Rhythm: Normal rate and regular rhythm.      Pulses: Normal pulses.      Heart sounds: Normal heart  "sounds.   Pulmonary:      Effort: Pulmonary effort is normal. No respiratory distress.      Breath sounds: Normal breath sounds.   Abdominal:      Palpations: Abdomen is soft.      Tenderness: There is no abdominal tenderness.   Musculoskeletal:         General: No swelling.      Cervical back: Neck supple.        Back:       Comments: Area of erythema, tenderness. No crepitus or lymphatic spread.    Skin:     General: Skin is warm and dry.      Capillary Refill: Capillary refill takes less than 2 seconds.   Neurological:      Mental Status: He is alert and oriented to person, place, and time. Mental status is at baseline.   Psychiatric:         Mood and Affect: Mood normal.         Behavior: Behavior normal.         Results Reviewed       None            No orders to display       Incision and drain    Date/Time: 10/20/2024 8:58 AM    Performed by: JERMAINE Ventura  Authorized by: JERMAINE Ventura  Universal Protocol:  procedure performed by consultantConsent: Verbal consent obtained.  Risks and benefits: risks, benefits and alternatives were discussed  Consent given by: patient and parent  Time out: Immediately prior to procedure a \"time out\" was called to verify the correct patient, procedure, equipment, support staff and site/side marked as required.  Timeout called at: 10/20/2024 8:58 AM.  Patient understanding: patient states understanding of the procedure being performed  Patient consent: the patient's understanding of the procedure matches consent given  Site marked: the operative site was marked  Required items: required blood products, implants, devices, and special equipment available  Patient identity confirmed: verbally with patient and arm band    Patient location:  ED  Location:     Type:  Pilonidal cyst    Location:  Anogenital    Anogenital location:  Pilonidal  Pre-procedure details:     Skin preparation:  Chloraprep  Anesthesia (see MAR for exact dosages):     Anesthesia method:  Local " infiltration    Local anesthetic:  Lidocaine 1% WITH epi  Procedure details:     Complexity:  Simple    Incision types:  Stab incision    Scalpel blade:  11    Approach:  Open    Wound management:  Probed and deloculated    Drainage:  Purulent    Drainage amount:  Copious    Wound treatment:  Wound left open    Packing materials:  None  Post-procedure details:     Patient tolerance of procedure:  Tolerated well, no immediate complications      ED Medication and Procedure Management   Prior to Admission Medications   Prescriptions Last Dose Informant Patient Reported? Taking?   ARIPiprazole (ABILIFY) 5 mg tablet   Yes No   Sig: Take 5 mg by mouth daily at bedtime   busPIRone (BUSPAR) 30 MG tablet   Yes No   Sig: Take 30 mg by mouth 2 (two) times a day   escitalopram (LEXAPRO) 20 mg tablet  Self, Father Yes No   Sig: Take 20 mg by mouth daily   hydrOXYzine HCL (ATARAX) 25 mg tablet  Self, Father Yes No   Sig: Take 25 mg by mouth daily as needed   lisinopril (ZESTRIL) 2.5 mg tablet   No No   Sig: TAKE 1 TABLET BY MOUTH EVERY DAY IN THE EVENING   metoprolol succinate (TOPROL-XL) 25 mg 24 hr tablet   No No   Sig: TAKE 1 TABLET (25 MG TOTAL) BY MOUTH DAILY.   naproxen (Naprosyn) 500 mg tablet   No No   Sig: Take 1 tablet (500 mg total) by mouth 2 (two) times a day with meals for 5 days      Facility-Administered Medications: None     Discharge Medication List as of 10/20/2024  9:36 AM        START taking these medications    Details   cephalexin (KEFLEX) 500 mg capsule Take 1 capsule (500 mg total) by mouth every 6 (six) hours for 7 days, Starting Sun 10/20/2024, Until Sun 10/27/2024, Normal      oxyCODONE (Roxicodone) 5 immediate release tablet Take 1 tablet (5 mg total) by mouth every 6 (six) hours as needed for severe pain for up to 4 doses Max Daily Amount: 20 mg, Starting Sun 10/20/2024, Normal           CONTINUE these medications which have NOT CHANGED    Details   ARIPiprazole (ABILIFY) 5 mg tablet Take 5 mg by  mouth daily at bedtime, Starting Sat 4/27/2024, Historical Med      busPIRone (BUSPAR) 30 MG tablet Take 30 mg by mouth 2 (two) times a day, Starting Wed 5/1/2024, Historical Med      escitalopram (LEXAPRO) 20 mg tablet Take 20 mg by mouth daily, Starting Tue 6/14/2022, Historical Med      hydrOXYzine HCL (ATARAX) 25 mg tablet Take 25 mg by mouth daily as needed, Starting Tue 6/14/2022, Historical Med      lisinopril (ZESTRIL) 2.5 mg tablet TAKE 1 TABLET BY MOUTH EVERY DAY IN THE EVENING, Starting Wed 12/6/2023, Normal      metoprolol succinate (TOPROL-XL) 25 mg 24 hr tablet TAKE 1 TABLET (25 MG TOTAL) BY MOUTH DAILY., Starting Fri 7/12/2024, Normal      naproxen (Naprosyn) 500 mg tablet Take 1 tablet (500 mg total) by mouth 2 (two) times a day with meals for 5 days, Starting Thu 10/17/2024, Until Tue 10/22/2024, Normal             ED SEPSIS DOCUMENTATION   Time reflects when diagnosis was documented in both MDM as applicable and the Disposition within this note       Time User Action Codes Description Comment    10/20/2024  9:33 AM Maty Castro Add [L05.91] Pilonidal cyst                  JERMAINE Ventura  10/20/24 0945       JERMAINE Ventura  10/20/24 1158

## 2024-11-07 ENCOUNTER — TELEPHONE (OUTPATIENT)
Age: 19
End: 2024-11-07

## 2024-11-07 NOTE — TELEPHONE ENCOUNTER
Father called stating patient was in the emergency room on 10/21 for an abscess on his tailbone. Ftaher stated abscess was expressed.    Patient was given a referral for general surgery. The consult is on 11/18.     Patient's abscess is starting to fill up again. Father is requesting recommendations on what patient can do to prevent it filling up completely and causing debilitating pain. Father stated patient's pain is good for now and is just looking for preventive measures.     Father is requesting a return call.    Please advise  Thank you

## 2024-11-07 NOTE — TELEPHONE ENCOUNTER
Spoke to father- we will not prescribe antibiotics . He can try hot compresses. Until he has the surgical consult

## 2024-11-13 NOTE — TELEPHONE ENCOUNTER
Phone call from patient's father stating his son stating he is having itching to the anal opening area and it is uncomfortable.He states it feels similar to when he was treated a while back for pinworn by Dr Castillo. Last year.  Father asking if he can be treated with an antibiotic and the cream they gave him for pinworm treatment.  Please advise. Thank you.

## 2024-11-18 ENCOUNTER — OFFICE VISIT (OUTPATIENT)
Dept: SURGERY | Facility: CLINIC | Age: 19
End: 2024-11-18
Payer: COMMERCIAL

## 2024-11-18 VITALS
RESPIRATION RATE: 18 BRPM | HEART RATE: 86 BPM | SYSTOLIC BLOOD PRESSURE: 124 MMHG | HEIGHT: 72 IN | OXYGEN SATURATION: 97 % | WEIGHT: 207 LBS | BODY MASS INDEX: 28.04 KG/M2 | DIASTOLIC BLOOD PRESSURE: 78 MMHG | TEMPERATURE: 97.5 F

## 2024-11-18 DIAGNOSIS — L05.91 PILONIDAL CYST: Primary | ICD-10-CM

## 2024-11-18 PROCEDURE — 99203 OFFICE O/P NEW LOW 30 MIN: CPT | Performed by: STUDENT IN AN ORGANIZED HEALTH CARE EDUCATION/TRAINING PROGRAM

## 2024-11-18 RX ORDER — CHLORHEXIDINE GLUCONATE 40 MG/ML
SOLUTION TOPICAL DAILY PRN
OUTPATIENT
Start: 2024-11-18

## 2024-11-18 NOTE — ASSESSMENT & PLAN NOTE
19-year-old male with pilonidal cyst with abscess  -Patient presents for evaluation of pilonidal cyst  -Noted pain on his tailbone and was seen in the emergency department and had an incision and drainage  -Since that time the area has healed up and he is feeling well  -On exam there are 2 sinuses and the superior gluteal cleft along with a small scar from the incision and drainage  -X-ray of the sacrum and coccyx from 10/17/2024 report reviewed  -Emergency department note from 10/20/2024 reviewed  -Primary care physician note from 10/17/2024 reviewed  -Echocardiogram report from 8/18/2023 reviewed  -Cardiology note from 10/12/2023 reviewed  -Plan for excision of pilonidal cyst under general anesthesia  -CBC, BMP ordered  -EKG ordered    All risks, benefits, alternatives of the procedure were discussed at length with the patient.  Risks include bleeding, infection, damage to surrounding structures, recurrence.  All questions were answered to satisfaction.  The patient voiced understanding and signed consent.    Orders:    Ambulatory Referral to General Surgery    Case request operating room: EXCISION PILONIDAL CYST; Standing    Basic metabolic panel; Future    CBC and Platelet; Future    EKG 12 lead; Future

## 2024-11-18 NOTE — PROGRESS NOTES
Name: Dave Estrada      : 2005      MRN: [id number]  Encounter Provider: Sam Melendez DO  Encounter Date: 2024   Encounter department: [facility]  :  Assessment & Plan  Pilonidal cyst  19-year-old male with pilonidal cyst with abscess  -Patient presents for evaluation of pilonidal cyst  -Noted pain on his tailbone and was seen in the emergency department and had an incision and drainage  -Since that time the area has healed up and he is feeling well  -On exam there are 2 sinuses and the superior gluteal cleft along with a small scar from the incision and drainage  -X-ray of the sacrum and coccyx from 10/17/2024 report reviewed  -Emergency department note from 10/20/2024 reviewed  -Primary care physician note from 10/17/2024 reviewed  -Echocardiogram report from 2023 reviewed  -Cardiology note from 10/12/2023 reviewed  -Plan for excision of pilonidal cyst under general anesthesia  -CBC, BMP ordered  -EKG ordered    All risks, benefits, alternatives of the procedure were discussed at length with the patient.  Risks include bleeding, infection, damage to surrounding structures, recurrence.  All questions were answered to satisfaction.  The patient voiced understanding and signed consent.    Orders:    Ambulatory Referral to General Surgery    Case request operating room: EXCISION PILONIDAL CYST; Standing    Basic metabolic panel; Future    CBC and Platelet; Future    EKG 12 lead; Future        History of Present Illness     HPI  Dave Estrada is a 19 y.o. male who presents for evaluation of pilonidal cyst and abscess.  Patient was recently seen in the emergency department due to a pilonidal cyst and abscess which was drained there.  States he had pain at his sacrum for about 1 week.  He has never had this issue before.  He is tolerating a diet and having bowel function.  History obtained from: patient    Review of Systems   Constitutional:  Negative for chills,  fatigue and fever.   HENT:  Negative for congestion, hearing loss, rhinorrhea and sore throat.    Eyes:  Negative for pain and discharge.   Respiratory:  Negative for cough, chest tightness and shortness of breath.    Cardiovascular:  Negative for chest pain and palpitations.   Gastrointestinal:  Negative for abdominal pain, constipation, diarrhea, nausea and vomiting.   Endocrine: Negative for cold intolerance and heat intolerance.   Genitourinary:  Negative for difficulty urinating and dysuria.   Musculoskeletal:  Negative for back pain and neck pain.   Skin:  Negative for color change and rash.        Positive pilonidal cyst   Allergic/Immunologic: Negative for environmental allergies and food allergies.   Neurological:  Negative for seizures and headaches.   Hematological:  Does not bruise/bleed easily.   Psychiatric/Behavioral:  Negative for confusion and hallucinations.      Past Medical History   Past Medical History:   Diagnosis Date    Allergic     Anxiety     Depression      Past Surgical History:   Procedure Laterality Date    CARDIAC CATHETERIZATION Left 10/11/2022    Procedure: Cardiac Left Heart Cath;  Surgeon: Kj Castellanos MD;  Location: [facility];  Service: Cardiology    CARDIAC CATHETERIZATION N/A 10/11/2022    Procedure: Cardiac Coronary Angiogram;  Surgeon: Kj Castellanos MD;  Location: [facility];  Service: Cardiology    WISDOM TOOTH EXTRACTION       History reviewed. No pertinent family history.   reports that he has quit smoking. His smoking use included cigarettes, pipe, and cigars. He has a 4 pack-year smoking history. He has been exposed to tobacco smoke. He has quit using smokeless tobacco.  His smokeless tobacco use included snuff. He reports that he does not currently use alcohol after a past usage of about 2.0 standard drinks of alcohol per week. He reports that he does not currently use drugs after having used the following drugs: Marijuana.  Current Outpatient  Medications on File Prior to Visit   Medication Sig Dispense Refill    ARIPiprazole (ABILIFY) 5 mg tablet Take 5 mg by mouth daily at bedtime      busPIRone (BUSPAR) 30 MG tablet Take 30 mg by mouth 2 (two) times a day      escitalopram (LEXAPRO) 20 mg tablet Take 20 mg by mouth daily      hydrOXYzine HCL (ATARAX) 25 mg tablet Take 25 mg by mouth daily as needed      lisinopril (ZESTRIL) 2.5 mg tablet TAKE 1 TABLET BY MOUTH EVERY DAY IN THE EVENING 90 tablet 3    metoprolol succinate (TOPROL-XL) 25 mg 24 hr tablet TAKE 1 TABLET (25 MG TOTAL) BY MOUTH DAILY. 90 tablet 1    naproxen (Naprosyn) 500 mg tablet Take 1 tablet (500 mg total) by mouth 2 (two) times a day with meals for 5 days 10 tablet 0    [DISCONTINUED] oxyCODONE (Roxicodone) 5 immediate release tablet Take 1 tablet (5 mg total) by mouth every 6 (six) hours as needed for severe pain for up to 4 doses Max Daily Amount: 20 mg (Patient not taking: Reported on 11/18/2024) 4 tablet 0     No current facility-administered medications on file prior to visit.     Allergies   Allergen Reactions    Bactrim [Sulfamethoxazole-Trimethoprim] Rash      Medical History Reviewed by provider this encounter:  Tobacco  Allergies  Meds  Problems  Med Hx  Surg Hx  Fam Hx     .  Past Medical History   Past Medical History:   Diagnosis Date    Allergic     Anxiety     Depression      Past Surgical History:   Procedure Laterality Date    CARDIAC CATHETERIZATION Left 10/11/2022    Procedure: Cardiac Left Heart Cath;  Surgeon: Kj Castellanos MD;  Location: [facility];  Service: Cardiology    CARDIAC CATHETERIZATION N/A 10/11/2022    Procedure: Cardiac Coronary Angiogram;  Surgeon: Kj Castellanos MD;  Location: [facility];  Service: Cardiology    WISDOM TOOTH EXTRACTION       History reviewed. No pertinent family history.   reports that he has quit smoking. His smoking use included cigarettes, pipe, and cigars. He has a 4 pack-year smoking history. He has  been exposed to tobacco smoke. He has quit using smokeless tobacco.  His smokeless tobacco use included snuff. He reports that he does not currently use alcohol after a past usage of about 2.0 standard drinks of alcohol per week. He reports that he does not currently use drugs after having used the following drugs: Marijuana.  Current Outpatient Medications on File Prior to Visit   Medication Sig Dispense Refill    ARIPiprazole (ABILIFY) 5 mg tablet Take 5 mg by mouth daily at bedtime      busPIRone (BUSPAR) 30 MG tablet Take 30 mg by mouth 2 (two) times a day      escitalopram (LEXAPRO) 20 mg tablet Take 20 mg by mouth daily      hydrOXYzine HCL (ATARAX) 25 mg tablet Take 25 mg by mouth daily as needed      lisinopril (ZESTRIL) 2.5 mg tablet TAKE 1 TABLET BY MOUTH EVERY DAY IN THE EVENING 90 tablet 3    metoprolol succinate (TOPROL-XL) 25 mg 24 hr tablet TAKE 1 TABLET (25 MG TOTAL) BY MOUTH DAILY. 90 tablet 1    naproxen (Naprosyn) 500 mg tablet Take 1 tablet (500 mg total) by mouth 2 (two) times a day with meals for 5 days 10 tablet 0    [DISCONTINUED] oxyCODONE (Roxicodone) 5 immediate release tablet Take 1 tablet (5 mg total) by mouth every 6 (six) hours as needed for severe pain for up to 4 doses Max Daily Amount: 20 mg (Patient not taking: Reported on 11/18/2024) 4 tablet 0     No current facility-administered medications on file prior to visit.     Allergies   Allergen Reactions    Bactrim [Sulfamethoxazole-Trimethoprim] Rash      Current Outpatient Medications on File Prior to Visit   Medication Sig Dispense Refill    ARIPiprazole (ABILIFY) 5 mg tablet Take 5 mg by mouth daily at bedtime      busPIRone (BUSPAR) 30 MG tablet Take 30 mg by mouth 2 (two) times a day      escitalopram (LEXAPRO) 20 mg tablet Take 20 mg by mouth daily      hydrOXYzine HCL (ATARAX) 25 mg tablet Take 25 mg by mouth daily as needed      lisinopril (ZESTRIL) 2.5 mg tablet TAKE 1 TABLET BY MOUTH EVERY DAY IN THE EVENING 90 tablet 3     metoprolol succinate (TOPROL-XL) 25 mg 24 hr tablet TAKE 1 TABLET (25 MG TOTAL) BY MOUTH DAILY. 90 tablet 1    naproxen (Naprosyn) 500 mg tablet Take 1 tablet (500 mg total) by mouth 2 (two) times a day with meals for 5 days 10 tablet 0    [DISCONTINUED] oxyCODONE (Roxicodone) 5 immediate release tablet Take 1 tablet (5 mg total) by mouth every 6 (six) hours as needed for severe pain for up to 4 doses Max Daily Amount: 20 mg (Patient not taking: Reported on 11/18/2024) 4 tablet 0     No current facility-administered medications on file prior to visit.      Social History     Tobacco Use    Smoking status: Former     Current packs/day: 1.00     Average packs/day: 1 pack/day for 4.0 years (4.0 ttl pk-yrs)     Types: Cigarettes, Pipe, Cigars     Passive exposure: Past    Smokeless tobacco: Former     Types: Snuff   Vaping Use    Vaping status: Former    Substances: Nicotine   Substance and Sexual Activity    Alcohol use: Not Currently     Alcohol/week: 2.0 standard drinks of alcohol    Drug use: Not Currently     Types: Marijuana    Sexual activity: Not Currently     Partners: Female     Birth control/protection: Condom Male        Objective   /78 (BP Location: Left arm, Patient Position: Sitting, Cuff Size: Standard)   Pulse 86   Temp 97.5 °F (36.4 °C)   Resp 18   Ht 6' (1.829 m)   Wt 93.9 kg (207 lb)   SpO2 97%   BMI 28.07 kg/m²      Physical Exam  Constitutional:       Appearance: Normal appearance.   HENT:      Head: Normocephalic and atraumatic.      Nose: Nose normal.   Eyes:      General: No scleral icterus.     Conjunctiva/sclera: Conjunctivae normal.   Cardiovascular:      Rate and Rhythm: Normal rate and regular rhythm.      Heart sounds: Normal heart sounds.   Pulmonary:      Effort: Pulmonary effort is normal.      Breath sounds: Normal breath sounds.   Musculoskeletal:         General: No signs of injury.   Skin:     General: Skin is warm.      Coloration: Skin is not jaundiced.      Comments:  2 sinuses and the superior gluteal cleft along with a small scar from the incision and drainage   Neurological:      General: No focal deficit present.      Mental Status: He is alert and oriented to person, place, and time.   Psychiatric:         Mood and Affect: Mood normal.         Behavior: Behavior normal.

## 2024-11-22 DIAGNOSIS — I42.9 CARDIOMYOPATHY, UNSPECIFIED TYPE (HCC): ICD-10-CM

## 2024-11-22 RX ORDER — LISINOPRIL 2.5 MG/1
2.5 TABLET ORAL EVERY EVENING
Qty: 90 TABLET | Refills: 0 | Status: SHIPPED | OUTPATIENT
Start: 2024-11-22

## 2024-11-25 ENCOUNTER — TELEPHONE (OUTPATIENT)
Age: 19
End: 2024-11-25

## 2024-11-25 NOTE — TELEPHONE ENCOUNTER
Spoke with pt's dad. He stated that linden is going to hold off on the surgery to see how things go. They will call back to reschedule if need be.

## 2025-01-28 DIAGNOSIS — I42.9 CARDIOMYOPATHY, UNSPECIFIED TYPE (HCC): ICD-10-CM

## 2025-01-28 NOTE — TELEPHONE ENCOUNTER
Reason for call:   [x] Refill   [] Prior Auth  [] Other:     Office:   [] PCP/Provider -   [x] Specialty/Provider - Nadia Saba/[facility]      Medication: Zestril    Dose/Frequency: 2.5 mg     Quantity: #90    Pharmacy: [facility]    Does the patient have enough for 3 days?   [x] Yes   [] No - Send as HP to POD

## 2025-01-29 RX ORDER — LISINOPRIL 2.5 MG/1
2.5 TABLET ORAL EVERY EVENING
Qty: 90 TABLET | Refills: 1 | Status: SHIPPED | OUTPATIENT
Start: 2025-01-29

## 2025-02-06 ENCOUNTER — OFFICE VISIT (OUTPATIENT)
Dept: CARDIOLOGY CLINIC | Facility: CLINIC | Age: 20
End: 2025-02-06
Payer: COMMERCIAL

## 2025-02-06 VITALS
WEIGHT: 215 LBS | OXYGEN SATURATION: 96 % | DIASTOLIC BLOOD PRESSURE: 78 MMHG | RESPIRATION RATE: 16 BRPM | BODY MASS INDEX: 29.12 KG/M2 | HEART RATE: 93 BPM | SYSTOLIC BLOOD PRESSURE: 122 MMHG | HEIGHT: 72 IN

## 2025-02-06 DIAGNOSIS — I42.9 CARDIOMYOPATHY, UNSPECIFIED TYPE (HCC): Primary | ICD-10-CM

## 2025-02-06 DIAGNOSIS — R06.02 SOB (SHORTNESS OF BREATH) ON EXERTION: ICD-10-CM

## 2025-02-06 DIAGNOSIS — I49.3 PVCS (PREMATURE VENTRICULAR CONTRACTIONS): ICD-10-CM

## 2025-02-06 DIAGNOSIS — R07.9 CHEST PAIN, UNSPECIFIED TYPE: ICD-10-CM

## 2025-02-06 PROBLEM — F19.11 H/O: SUBSTANCE ABUSE (HCC): Status: ACTIVE | Noted: 2025-02-06

## 2025-02-06 PROCEDURE — 99214 OFFICE O/P EST MOD 30 MIN: CPT | Performed by: INTERNAL MEDICINE

## 2025-02-06 PROCEDURE — 93000 ELECTROCARDIOGRAM COMPLETE: CPT | Performed by: INTERNAL MEDICINE

## 2025-02-06 RX ORDER — METOPROLOL SUCCINATE 25 MG/1
25 TABLET, EXTENDED RELEASE ORAL 2 TIMES DAILY
Qty: 180 TABLET | Refills: 3 | Status: SHIPPED | OUTPATIENT
Start: 2025-02-06 | End: 2025-02-07

## 2025-02-06 NOTE — PROGRESS NOTES
Advanced Heart Failure Outpatient Progress Note - Dave Estrada 19 y.o. male MRN: [id number]    Encounter: [id number]      Assessment/Plan:    Patient Active Problem List    Diagnosis Date Noted    Pilonidal cyst 11/18/2024    Furuncle 03/04/2024    Low left ventricular ejection fraction 09/13/2022    Recurrent major depressive disorder, in partial remission (HCC) 08/03/2022    Generalized anxiety disorder 08/03/2022    PTSD (post-traumatic stress disorder) 08/03/2022    Overweight 08/03/2022    SOB (shortness of breath) on exertion 08/03/2022    Coccydynia 08/03/2022     Cardiomyopathy, Stage B/C, NYHA II  Etiology: Unclear. Nonischemic. Possible toxin mediated - history of drug abuse although unknown substances used. Normal coronaries. Cardiac MRI negative for inflammation, infiltrative disease or scarring.  Reports mild COVID infection around the time of diagnosis. COVID vaccination June and July 2021. 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  Euvolemic, warm on exam     Weight: 180 lbs; 185 lbs 10/12/23; 215 2/6/2025     Studies- personally reviewed by me  Serology: TSH normal, hepatitis C and HIV screen negative  EKG 5/19/23: Sinus rhythm, rare PVC     Echo 8/18/23: LVEF 50-55%. LVIDD 5.4cm. Normal wall thickness. No regional all motion abnormalities    Cardiac MRI 10/12/22:  LVEF 45%. CO 6.4 LPM  LVIDD 6cm, normal wall thickness, no RWMA  Normal RV size and systolic function, no RWMA  Normal LA size  No evidence of fibrofatty infiltration of RV myocardium  Delayed post-gadolinium imaging demonstrates no region of hyperenhancement.     Zio 10/14/22:  Sinus rhythm  One run of NSVT up to 5 beats at 197 bpm  Frequent PVCs, 7.8% burden  Rare PACs  Symptoms reported including lightheadedness, dizziness, chest pain/pressure, pain and tingling in the neck or  arm, palpitations, shortness of breath, fluttering/racing during normal sinus rhythm, PVCs and ventricular bigeminy.     LHC 10/11/22: Normal epicardial coronary arteries     Echocardiogram 9/13/22  LVEF: 40-45%  LVIDd: 5.3cm, normal wall thickness  RV: normal size and systolic function  MR: none  PASP: unable to estimate, no TR  RVOT: normal, parabolic  Other: no pericardial effusion, normal diastology     Neurohormonal Blockade:  --Beta-Blocker: metoprolol succinate 25mg daily  --ACEi, ARB or ARNi: lisinopril 2.5mg daily  --Aldosterone Receptor Blocker:  --SGLT2 Inhibitor:  --Diuretic:     Sudden Cardiac Death Risk Reduction:  --ICD: LVEF >35%     Electrical Resynchronization:  --Candidacy for BiV device: narrow QRS     Advanced Therapies (if appropriate):  --We will continue to monitor, not indicated at this time     H/o chest pain  Normal coronaries as above  Reports intermittent chest tightness with shortness of breath  Anxiety   Depression  PVCs, improved with initiation of betablocker, seen by Dr. Ivey  EKG today showed sinus rhythm with frequent PVCs.  Increase metoprolol as below  H/o substance abuse, unknown agents, remains in remission  COVID 9/2022, mild symptoms, primarily migraines per patient   COVID vaccine, 6/30 and 7/2021     Today's Plan:  Increase metoprolol succinate to 25mg two times a day  Continue lisinopril  Obtain echocardiogram to reassess biventricular systolic function  Physical activities/walking as tolerated        HPI:   Initial consult 5/18/23: 19 y/o male with past med history as above initially seen in consultation by Dr. Castillo on 8 September 2022.  Patient has been having occasional palpitations.also with shortness of breath and lightheadedness walking up a flight of stairs.  Also with chest pressure tightness.  Primary care doctor ordered an echocardiogram which showed EF of 40 to 45% with mild global hypokinesis and patient was sent for cardiology evaluation.  He underwent  "cardiac catheterization which showed normal coronary arteries.  EKG showed PVCs.  He underwent extended ambulatory Holter monitor which showed PVC burden of 7.8%.  He was in sinus rhythm throughout the period of monitoring.  Symptoms correlated with sinus rhythm, PVCs and ventricular bigeminy.  He underwent cardiac MRI which showed LVEF of 45%, mildly dilated LV at 6 cm, normal RV size and systolic function, no evidence of inflammation, infiltrative disease or scaring.  Seen by Dr. Ivey for evaluation of frequent PVCs.  This is deemed to be overall improved after initiation of beta-blocker with plan for repeat Holter monitoring should he have more episodes.  He was scheduled to see me in December 2022 for heart failure consultation but patient canceled appointment, now he is here to establish care.  He is accompanied by his father at office visit today.  Patient with reported history of substance abuse prior to onset of cardiomyopathy.  Unknown substances used.  Patient currently denying palpitations or chest pain but still reporting poor activity tolerance.  He is now currently working a car wash.  Father notes the less activity intolerance and him.  The patient does not engage in regular physical activities or exercise and gets easily exhausted.  Patient is adopted, family history unknown.    10/12/23: Mr. Estrada presents today accompanied by his dad. They consented to the usage of MARISSA.   He has been doing well and denies any concerns today.   He denies trouble breathing, swelling, or bloating.   Other than his normal tiredness, he denies fatigue.   The patient can work a full day without any issues.   Outside of work, he does not do much physical activity other than shooting his bow and arrow for 5 to 10 minutes.   He will go outside and ride his minibike   He does not have interest in walking their new puppy.   The patient does not do more physical activity because he \"just does not want to.\"  He denies " any issues with his medications.     2/6/2025: Patient is here for follow-up.  Last seen by me as above.  Reports noting shortness of breath with more exertion over the past few months.  Notices symptoms with lifting and walking up a flight of stairs.  He is still fairly inactive with no regular physical/exercise routine.  Denies palpitations, dizziness or lightheadedness.  Reports occasional chest tightness.  Reports adherence to medications as prescribed.  EKG today showed sinus rhythm with frequent PVCs and incomplete right bundle branch block.      Past Medical History:   Diagnosis Date    Allergic     Anxiety     Depression        Allergies   Allergen Reactions    Bactrim [Sulfamethoxazole-Trimethoprim] Rash     .    Current Outpatient Medications:     ARIPiprazole (ABILIFY) 5 mg tablet, Take 5 mg by mouth daily at bedtime, Disp: , Rfl:     busPIRone (BUSPAR) 30 MG tablet, Take 30 mg by mouth 2 (two) times a day, Disp: , Rfl:     escitalopram (LEXAPRO) 20 mg tablet, Take 20 mg by mouth daily, Disp: , Rfl:     hydrOXYzine HCL (ATARAX) 25 mg tablet, Take 25 mg by mouth daily as needed, Disp: , Rfl:     lisinopril (ZESTRIL) 2.5 mg tablet, Take 1 tablet (2.5 mg total) by mouth every evening, Disp: 90 tablet, Rfl: 1    metoprolol succinate (TOPROL-XL) 25 mg 24 hr tablet, Take 1 tablet (25 mg total) by mouth 2 (two) times a day, Disp: 180 tablet, Rfl: 3    naproxen (Naprosyn) 500 mg tablet, Take 1 tablet (500 mg total) by mouth 2 (two) times a day with meals for 5 days, Disp: 10 tablet, Rfl: 0    Social History     Socioeconomic History    Marital status: Single     Spouse name: Not on file    Number of children: Not on file    Years of education: Not on file    Highest education level: Not on file   Occupational History    Not on file   Tobacco Use    Smoking status: Former     Current packs/day: 1.00     Average packs/day: 1 pack/day for 4.0 years (4.0 ttl pk-yrs)     Types: Cigarettes, Pipe, Cigars     Passive  exposure: Past    Smokeless tobacco: Former     Types: Snuff   Vaping Use    Vaping status: Former    Substances: Nicotine   Substance and Sexual Activity    Alcohol use: Not Currently     Alcohol/week: 2.0 standard drinks of alcohol    Drug use: Not Currently     Types: Marijuana    Sexual activity: Not Currently     Partners: Female     Birth control/protection: Condom Male   Other Topics Concern    Not on file   Social History Narrative    Not on file     Social Drivers of Health     Financial Resource Strain: Not on file   Food Insecurity: Not on file   Transportation Needs: Not on file   Physical Activity: Not on file   Stress: Not on file   Social Connections: Not on file   Intimate Partner Violence: Not on file   Housing Stability: Not on file       No family history on file.    Physical Exam:    Vitals:   Vitals:    02/06/25 1311   BP: 122/78   Pulse: 93   Resp: 16   SpO2: 96%         Physical Exam  Constitutional:       General: He is not in acute distress.     Appearance: Normal appearance.   HENT:      Head: Normocephalic and atraumatic.      Mouth/Throat:      Mouth: Mucous membranes are moist.   Eyes:      General: No scleral icterus.     Extraocular Movements: Extraocular movements intact.   Cardiovascular:      Rate and Rhythm: Normal rate and regular rhythm.      Pulses: Normal pulses.      Heart sounds: S1 normal and S2 normal. No murmur heard.     No friction rub. No gallop.   Pulmonary:      Breath sounds: Normal breath sounds.   Abdominal:      General: There is no distension.      Palpations: Abdomen is soft.      Tenderness: There is no abdominal tenderness. There is no guarding or rebound.   Musculoskeletal:         General: Normal range of motion.      Cervical back: Neck supple.   Skin:     General: Skin is warm and dry.      Capillary Refill: Capillary refill takes less than 2 seconds.   Neurological:      General: No focal deficit present.      Mental Status: He is alert and oriented to  "person, place, and time.   Psychiatric:         Mood and Affect: Mood normal.         Labs & Results:    Lab Results   Component Value Date    SODIUM 139 10/11/2022    K 3.5 10/11/2022     10/11/2022    CO2 27 10/11/2022    BUN 16 10/11/2022    CREATININE 1.02 10/11/2022    GLUC 143 (H) 10/11/2022    CALCIUM 8.9 10/11/2022     Lab Results   Component Value Date    WBC 8.09 09/27/2022    HGB 15.7 09/27/2022    HCT 47.5 09/27/2022    MCV 86 09/27/2022     09/27/2022     No results found for: \"NTBNP\"   Lab Results   Component Value Date    CHOLESTEROL 133 08/17/2022     Lab Results   Component Value Date    HDL 42 08/17/2022     Lab Results   Component Value Date    TRIG 166 (H) 08/17/2022     Lab Results   Component Value Date    NONHDLC 91 08/17/2022       EKG personally reviewed by Sharon Saba MD .     Counseling / Coordination of Care  Time spent today 25 minutes.  Greater than 50% of total time was spent with the patient and / or family counseling and / or coordination of care. We went over current diagnosis, most recent studies and any changes in treatment.    Thank you for the opportunity to participate in the care of this patient.    SHARON SABA MD  ADVANCED HEART FAILURE AND MECHANICAL CIRCULATORY SUPPORT  [facility]      Transcribed for Sharon Saba MD, by Lindsey Cash on 02/06/25 at 3:08 PM. Powered by Dragon [employer].   "

## 2025-02-06 NOTE — PATIENT INSTRUCTIONS
Increase metoprolol succinate to 25mg two times a day  Continue lisinopril  Obtain echocardiogram  Physical activities/walking as tolerated

## 2025-02-07 DIAGNOSIS — I49.3 PVCS (PREMATURE VENTRICULAR CONTRACTIONS): ICD-10-CM

## 2025-02-07 DIAGNOSIS — I42.9 CARDIOMYOPATHY, UNSPECIFIED TYPE (HCC): ICD-10-CM

## 2025-02-07 RX ORDER — METOPROLOL SUCCINATE 25 MG/1
TABLET, EXTENDED RELEASE ORAL
Qty: 90 TABLET | Refills: 1 | Status: SHIPPED | OUTPATIENT
Start: 2025-02-07

## 2025-02-18 ENCOUNTER — HOSPITAL ENCOUNTER (OUTPATIENT)
Dept: NON INVASIVE DIAGNOSTICS | Facility: CLINIC | Age: 20
Discharge: HOME | End: 2025-02-18
Payer: COMMERCIAL

## 2025-02-18 VITALS
DIASTOLIC BLOOD PRESSURE: 78 MMHG | HEIGHT: 72 IN | WEIGHT: 215 LBS | SYSTOLIC BLOOD PRESSURE: 122 MMHG | BODY MASS INDEX: 29.12 KG/M2 | HEART RATE: 82 BPM

## 2025-02-18 DIAGNOSIS — I42.9 CARDIOMYOPATHY, UNSPECIFIED TYPE (HCC): ICD-10-CM

## 2025-02-18 DIAGNOSIS — I49.3 PVCS (PREMATURE VENTRICULAR CONTRACTIONS): ICD-10-CM

## 2025-02-18 LAB
AORTIC ROOT: 3.3 CM
ASCENDING AORTA: 3.2 CM
BSA FOR ECHO PROCEDURE: 2.2 M2
E WAVE DECELERATION TIME: 158 MS
E/A RATIO: 0.71
FRACTIONAL SHORTENING: 26 (ref 28–44)
INTERVENTRICULAR SEPTUM IN DIASTOLE (PARASTERNAL SHORT AXIS VIEW): 0.9 CM
INTERVENTRICULAR SEPTUM: 0.9 CM (ref 0.6–1.1)
LAAS-AP2: 11.4 CM2
LAAS-AP4: 14.7 CM2
LEFT ATRIUM SIZE: 3.2 CM
LEFT ATRIUM VOLUME (MOD BIPLANE): 31 ML
LEFT ATRIUM VOLUME INDEX (MOD BIPLANE): 14.1 ML/M2
LEFT INTERNAL DIMENSION IN SYSTOLE: 3.9 CM (ref 2.1–4)
LEFT VENTRICULAR INTERNAL DIMENSION IN DIASTOLE: 5.3 CM (ref 3.5–6)
LEFT VENTRICULAR POSTERIOR WALL IN END DIASTOLE: 0.9 CM
LEFT VENTRICULAR STROKE VOLUME: 70 ML
LV EF US.2D.A4C+ESTIMATED: 51 %
LVSV (TEICH): 70 ML
MV E'TISSUE VEL-LAT: 14 CM/S
MV E'TISSUE VEL-SEP: 13 CM/S
MV PEAK A VEL: 0.56 M/S
MV PEAK E VEL: 40 CM/S
MV STENOSIS PRESSURE HALF TIME: 46 MS
MV VALVE AREA P 1/2 METHOD: 4.78
RIGHT ATRIUM AREA SYSTOLE A4C: 14.4 CM2
RIGHT VENTRICLE ID DIMENSION: 3.4 CM
SL CV LEFT ATRIUM LENGTH A2C: 4.2 CM
SL CV LV EF: 50
SL CV PED ECHO LEFT VENTRICLE DIASTOLIC VOLUME (MOD BIPLANE) 2D: 135 ML
SL CV PED ECHO LEFT VENTRICLE SYSTOLIC VOLUME (MOD BIPLANE) 2D: 64 ML
TRICUSPID ANNULAR PLANE SYSTOLIC EXCURSION: 1.9 CM

## 2025-02-18 PROCEDURE — 93306 TTE W/DOPPLER COMPLETE: CPT

## 2025-02-18 PROCEDURE — 93306 TTE W/DOPPLER COMPLETE: CPT | Performed by: INTERNAL MEDICINE

## 2025-02-21 ENCOUNTER — RESULTS FOLLOW-UP (OUTPATIENT)
Dept: CARDIOLOGY CLINIC | Facility: CLINIC | Age: 20
End: 2025-02-21

## 2025-04-09 ENCOUNTER — TELEPHONE (OUTPATIENT)
Age: 20
End: 2025-04-09

## 2025-04-09 NOTE — TELEPHONE ENCOUNTER
Patients father called stating that patient is scheduled for an OV on 4/14 to have a cyst looked at however, he would like to know if Provider can send in a script for an antibiotic as cyst is inflamed and painful.     He would like script sent to [facility]/pharmacy #[id number] - [facility] - [address] [phone]     Please contact patient at [phone] with an update. Thank you.

## 2025-04-10 ENCOUNTER — HOSPITAL ENCOUNTER (EMERGENCY)
Facility: HOSPITAL | Age: 20
Discharge: HOME | End: 2025-04-10
Attending: EMERGENCY MEDICINE
Payer: COMMERCIAL

## 2025-04-10 VITALS
BODY MASS INDEX: 28.49 KG/M2 | HEART RATE: 113 BPM | OXYGEN SATURATION: 99 % | RESPIRATION RATE: 18 BRPM | WEIGHT: 210.1 LBS | SYSTOLIC BLOOD PRESSURE: 149 MMHG | DIASTOLIC BLOOD PRESSURE: 86 MMHG | TEMPERATURE: 98.3 F

## 2025-04-10 DIAGNOSIS — L05.01 PILONIDAL ABSCESS: Primary | ICD-10-CM

## 2025-04-10 PROCEDURE — 99283 EMERGENCY DEPT VISIT LOW MDM: CPT

## 2025-04-10 PROCEDURE — 10081 I&D PILONIDAL CYST COMP: CPT | Performed by: PHYSICIAN ASSISTANT

## 2025-04-10 PROCEDURE — 99284 EMERGENCY DEPT VISIT MOD MDM: CPT | Performed by: PHYSICIAN ASSISTANT

## 2025-04-10 RX ORDER — DOXYCYCLINE 100 MG/1
100 CAPSULE ORAL 2 TIMES DAILY
Qty: 14 CAPSULE | Refills: 0 | Status: SHIPPED | OUTPATIENT
Start: 2025-04-10 | End: 2025-04-17

## 2025-04-10 RX ORDER — HYDROCODONE BITARTRATE AND ACETAMINOPHEN 5; 325 MG/1; MG/1
1 TABLET ORAL EVERY 6 HOURS PRN
Qty: 4 TABLET | Refills: 0 | Status: SHIPPED | OUTPATIENT
Start: 2025-04-10 | End: 2025-04-10

## 2025-04-10 RX ORDER — LIDOCAINE HYDROCHLORIDE AND EPINEPHRINE 10; 10 MG/ML; UG/ML
10 INJECTION, SOLUTION INFILTRATION; PERINEURAL ONCE
Status: COMPLETED | OUTPATIENT
Start: 2025-04-10 | End: 2025-04-10

## 2025-04-10 RX ADMIN — LIDOCAINE HYDROCHLORIDE,EPINEPHRINE BITARTRATE 10 ML: 10; .01 INJECTION, SOLUTION INFILTRATION; PERINEURAL at 07:44

## 2025-04-10 NOTE — ED PROVIDER NOTES
Time reflects when diagnosis was documented in both MDM as applicable and the Disposition within this note       Time User Action Codes Description Comment    4/10/2025  8:14 AM Antonio Ren Add [L05.01] Pilonidal abscess           ED Disposition       ED Disposition   Discharge    Condition   Stable    Date/Time   Thu Apr 10, 2025  8:14 AM    Comment   Dave Estrada discharge to home/self care.                   Assessment & Plan       Medical Decision Making  20-year-old male patient comes in with what he believes a pilonidal cyst for about a week he has had previous and did not follow-up with surgery as directed nothing makes it better or worse he has no systemic symptoms differential diagnosis includes not limited to infected pilonidal cyst, perirectal abscess less likely, pain perianal abscess less likely    Problems Addressed:  Pilonidal abscess: acute illness or injury     Details: Incision and drainage occurred by me tolerated well without complication placed on doxycycline    Amount and/or Complexity of Data Reviewed  External Data Reviewed: notes.     Details: I did review previous notes for past medical history of having I&D of this before  Discussion of management or test interpretation with external provider(s): Using joint decision-making with the patient he agreed to be discharged he was told to have the pack removed in 48 to 72 hours he can then remove it himself return here or go to his family doctor.  Ambulatory referral was placed to surgery.  He was planning to have surgery at his last visit but he backed out because he was feeling better he will now go to surgery.  He was told to return with any worsening symptoms questions comments concern he verbalized understanding and agreement    Risk  Prescription drug management.             Medications   lidocaine-epinephrine (XYLOCAINE/EPINEPHRINE) 1 %-1:100,000 injection 10 mL (10 mL Infiltration Given 4/10/25 0744)       ED Risk Strat Scores                     No data recorded        SBIRT 20yo+      Flowsheet Row Most Recent Value   Initial Alcohol Screen: US AUDIT-C     1. How often do you have a drink containing alcohol? 0 Filed at: 04/10/2025 0742   2. How many drinks containing alcohol do you have on a typical day you are drinking?  1 Filed at: 04/10/2025 0742   3a. Male UNDER 65: How often do you have five or more drinks on one occasion? 1 Filed at: 04/10/2025 0742   Audit-C Score 2 Filed at: 04/10/2025 0742   LEE: How many times in the past year have you...    Used an illegal drug or used a prescription medication for non-medical reasons? Never Filed at: 04/10/2025 0742                            History of Present Illness       Chief Complaint   Patient presents with    Abscess     Hx of pilonidal cyst has increased pain. Denies drainage and fevers       Past Medical History:   Diagnosis Date    Allergic     Anxiety     Depression       Past Surgical History:   Procedure Laterality Date    CARDIAC CATHETERIZATION Left 10/11/2022    Procedure: Cardiac Left Heart Cath;  Surgeon: Kj Castellanos MD;  Location: [facility];  Service: Cardiology    CARDIAC CATHETERIZATION N/A 10/11/2022    Procedure: Cardiac Coronary Angiogram;  Surgeon: Kj Castellanos MD;  Location: [facility];  Service: Cardiology    WISDOM TOOTH EXTRACTION        History reviewed. No pertinent family history.   Social History     Tobacco Use    Smoking status: Former     Current packs/day: 1.00     Average packs/day: 1 pack/day for 4.0 years (4.0 ttl pk-yrs)     Types: Cigarettes, Pipe, Cigars     Passive exposure: Past    Smokeless tobacco: Former     Types: Snuff   Vaping Use    Vaping status: Former    Substances: Nicotine   Substance Use Topics    Alcohol use: Not Currently     Alcohol/week: 2.0 standard drinks of alcohol    Drug use: Not Currently     Types: Marijuana      E-Cigarette/Vaping    E-Cigarette Use Former User       E-Cigarette/Vaping Substances  "   Nicotine Yes     THC No     CBD No     Flavoring No     Other No     Unknown No       I have reviewed and agree with the history as documented.     20-year-old male patient presents with a \"pilonidal cyst\" he had an incision and drainage of the same area on 10/20/2024 and subsequently had a follow-up with surgery in 11/25/2024 decided he did not think he needed surgery because he was doing well unfortunately last week the cyst returned he has pain and swelling of the left upper buttock not involving the rectum no fever or chills no other symptoms.  He came in today to have an incision and drainage and he wants a referral to a surgeon again.        Review of Systems   Constitutional:  Negative for chills, diaphoresis, fatigue and fever.   HENT:  Negative for congestion, ear pain, nosebleeds and sore throat.    Eyes:  Negative for photophobia, pain, discharge and visual disturbance.   Respiratory:  Negative for cough, choking, chest tightness, shortness of breath and wheezing.    Cardiovascular:  Negative for chest pain and palpitations.   Gastrointestinal:  Negative for abdominal distention, abdominal pain, diarrhea and vomiting.   Genitourinary:  Negative for dysuria, flank pain, frequency and hematuria.   Musculoskeletal:  Negative for arthralgias, back pain, gait problem and joint swelling.   Skin:  Negative for color change and rash.        Pilonidal cyst   Neurological:  Negative for dizziness, seizures, syncope and headaches.   Psychiatric/Behavioral:  Negative for behavioral problems and confusion. The patient is not nervous/anxious.    All other systems reviewed and are negative.          Objective       ED Triage Vitals [04/10/25 0724]   Temperature Pulse Blood Pressure Respirations SpO2 Patient Position - Orthostatic VS   98.3 °F (36.8 °C) (!) 113 149/86 18 99 % Sitting      Temp Source Heart Rate Source BP Location FiO2 (%) Pain Score    Probe Monitor Left arm -- --      Vitals      Date and Time Temp " "Pulse SpO2 Resp BP Pain Score FACES Pain Rating User   04/10/25 0724 98.3 °F (36.8 °C) 113 99 % 18 149/86 -- -- CT            Physical Exam  Vitals and nursing note reviewed.   Constitutional:       General: He is not in acute distress.     Appearance: Normal appearance. He is well-developed. He is not ill-appearing, toxic-appearing or diaphoretic.   HENT:      Head: Normocephalic and atraumatic.      Mouth/Throat:      Mouth: Mucous membranes are moist.   Eyes:      Conjunctiva/sclera: Conjunctivae normal.   Cardiovascular:      Rate and Rhythm: Normal rate and regular rhythm.   Pulmonary:      Effort: Pulmonary effort is normal. No respiratory distress.   Abdominal:      Palpations: Abdomen is soft.      Tenderness: There is no abdominal tenderness.   Musculoskeletal:      Cervical back: Neck supple.        Legs:    Skin:     General: Skin is warm and dry.      Capillary Refill: Capillary refill takes less than 2 seconds.   Neurological:      Mental Status: He is alert.   Psychiatric:         Mood and Affect: Mood normal.         Results Reviewed       None            No orders to display       Incision and drain    Date/Time: 4/10/2025 8:12 AM    Performed by: Antonio Mclain PA-C  Authorized by: Antonio Mclain PA-C  Universal Protocol:  procedure performed by consultantConsent: Verbal consent obtained. Written consent obtained.  Risks and benefits: risks, benefits and alternatives were discussed  Consent given by: patient  Time out: Immediately prior to procedure a \"time out\" was called to verify the correct patient, procedure, equipment, support staff and site/side marked as required.  Patient understanding: patient states understanding of the procedure being performed  Patient identity confirmed: verbally with patient    Patient location:  ED  Location:     Type:  Pilonidal cyst    Size:  3    Location: Upper left buttocks/sacrum does not include rectum or anus.  Pre-procedure details:     Skin " "preparation:  Betadine  Anesthesia (see MAR for exact dosages):     Anesthesia method:  Local infiltration    Local anesthetic:  Lidocaine 1% WITH epi  Procedure details:     Complexity:  Intermediate    Needle aspiration: no      Incision types:  Stab incision    Scalpel blade:  11    Approach:  Open    Incision depth:  Subcutaneous    Wound management:  Probed and deloculated and debrided    Drainage:  Bloody and purulent    Drainage amount:  Copious    Wound treatment:  Packing placed    Packing materials:  1/2 in iodoform gauze    Amount 1/2\" iodoform:  10  Post-procedure details:     Patient tolerance of procedure:  Tolerated well, no immediate complications      ED Medication and Procedure Management   Prior to Admission Medications   Prescriptions Last Dose Informant Patient Reported? Taking?   ARIPiprazole (ABILIFY) 5 mg tablet  Self, Father Yes No   Sig: Take 5 mg by mouth daily at bedtime   busPIRone (BUSPAR) 30 MG tablet  Self, Father Yes No   Sig: Take 30 mg by mouth 2 (two) times a day   escitalopram (LEXAPRO) 20 mg tablet  Self, Father Yes No   Sig: Take 20 mg by mouth daily   hydrOXYzine HCL (ATARAX) 25 mg tablet  Self, Father Yes No   Sig: Take 25 mg by mouth daily as needed   lisinopril (ZESTRIL) 2.5 mg tablet   No No   Sig: Take 1 tablet (2.5 mg total) by mouth every evening   metoprolol succinate (TOPROL-XL) 25 mg 24 hr tablet   No No   Sig: TAKE 1 TABLET (25 MG TOTAL) BY MOUTH DAILY.   naproxen (Naprosyn) 500 mg tablet  Self, Father No No   Sig: Take 1 tablet (500 mg total) by mouth 2 (two) times a day with meals for 5 days      Facility-Administered Medications: None     Discharge Medication List as of 4/10/2025  8:23 AM        START taking these medications    Details   doxycycline hyclate (VIBRAMYCIN) 100 mg capsule Take 1 capsule (100 mg total) by mouth 2 (two) times a day for 7 days, Starting Thu 4/10/2025, Until Thu 4/17/2025, Normal           CONTINUE these medications which have NOT " CHANGED    Details   ARIPiprazole (ABILIFY) 5 mg tablet Take 5 mg by mouth daily at bedtime, Starting Sat 4/27/2024, Historical Med      busPIRone (BUSPAR) 30 MG tablet Take 30 mg by mouth 2 (two) times a day, Starting Wed 5/1/2024, Historical Med      escitalopram (LEXAPRO) 20 mg tablet Take 20 mg by mouth daily, Starting Tue 6/14/2022, Historical Med      hydrOXYzine HCL (ATARAX) 25 mg tablet Take 25 mg by mouth daily as needed, Starting Tue 6/14/2022, Historical Med      lisinopril (ZESTRIL) 2.5 mg tablet Take 1 tablet (2.5 mg total) by mouth every evening, Starting Wed 1/29/2025, Normal      metoprolol succinate (TOPROL-XL) 25 mg 24 hr tablet TAKE 1 TABLET (25 MG TOTAL) BY MOUTH DAILY., Normal           STOP taking these medications       naproxen (Naprosyn) 500 mg tablet Comments:   Reason for Stopping:               ED SEPSIS DOCUMENTATION   Time reflects when diagnosis was documented in both MDM as applicable and the Disposition within this note       Time User Action Codes Description Comment    4/10/2025  8:14 AM Antonio Mclain Add [L05.01] Pilonidal abscess                  Antonio Mclain PA-C  04/10/25 0184

## 2025-04-10 NOTE — DISCHARGE INSTRUCTIONS
Leave the packing in for 48 to 72 hours.  Just change the gauze it is supposed to drain.    You can either pull the pack yourself or return here.    Call the surgeon listed so that you can have this further evaluated and treated    Return with any worsening symptoms questions comments or concerns

## 2025-04-10 NOTE — Clinical Note
Dave Estrada was seen and treated in our emergency department on 4/10/2025.                Diagnosis: Seen in emergency department    Dave  .    He may return on this date: 04/12/2025         If you have any questions or concerns, please don't hesitate to call.      Antonio Mclain PA-C    ______________________________           _______________          _______________  Hospital Representative                              Date                                Time

## 2025-04-14 ENCOUNTER — OFFICE VISIT (OUTPATIENT)
Age: 20
End: 2025-04-14
Payer: COMMERCIAL

## 2025-04-14 VITALS
WEIGHT: 210 LBS | RESPIRATION RATE: 18 BRPM | TEMPERATURE: 97.8 F | SYSTOLIC BLOOD PRESSURE: 142 MMHG | HEIGHT: 72 IN | HEART RATE: 104 BPM | OXYGEN SATURATION: 97 % | DIASTOLIC BLOOD PRESSURE: 76 MMHG | BODY MASS INDEX: 28.44 KG/M2

## 2025-04-14 DIAGNOSIS — F41.1 GENERALIZED ANXIETY DISORDER: ICD-10-CM

## 2025-04-14 DIAGNOSIS — L05.91 PILONIDAL CYST: Primary | ICD-10-CM

## 2025-04-14 DIAGNOSIS — Z13.228 SCREENING FOR METABOLIC DISORDER: ICD-10-CM

## 2025-04-14 DIAGNOSIS — F33.41 RECURRENT MAJOR DEPRESSIVE DISORDER, IN PARTIAL REMISSION (HCC): ICD-10-CM

## 2025-04-14 PROCEDURE — 99214 OFFICE O/P EST MOD 30 MIN: CPT | Performed by: STUDENT IN AN ORGANIZED HEALTH CARE EDUCATION/TRAINING PROGRAM

## 2025-04-14 NOTE — PROGRESS NOTES
Name: Dave Estrada      : 2005      MRN: [id number]  Encounter Provider: Deny Vallejo MD  Encounter Date: 2025   Encounter department: [facility] [place]  :  Assessment & Plan  Pilonidal cyst  No signs of infection. Complete course of doxycycline. Follow up with General Surgery for further evaluation/treatment.       Generalized anxiety disorder  Stopped his Lexapro, Abilify, and Buspar abruptly a few weeks ago. He did not like the way he felt on them but is noticing increased irritability and mood swings. He is agreeable to starting the medications again, but we will plan for him to start 1 medication at a time for 5-7 days before starting the next one to monitor for side effects. Follow up with his Psychiatrist.       Recurrent major depressive disorder, in partial remission (HCC)  Stopped his Lexapro, Abilify, and Buspar abruptly a few weeks ago. He did not like the way he felt on them but is noticing increased irritability and mood swings. He is agreeable to starting the medications again, but we will plan for him to start 1 medication at a time for 5-7 days before starting the next one to monitor for side effects. Follow up with his Psychiatrist.         Screening for metabolic disorder  Get labs prior to annual physical at next office visit.    Orders:    CBC and differential; Future    Comprehensive metabolic panel; Future    Lipid panel; Future           History of Present Illness   Dave Estrada is a 19 yo M with PMH of CLEOPATRA, MDD, PTSD, and cardiomyopathy who presents today for ED follow up. He was at the ED 4 days ago for pilonidal cyst recurrence. He had I&D, given course of antibiotics, and referred to General surgery. He has a few days left of antibiotics. He still has some comfort at the top of his gluteal cleft. He reports stopping all of his psychiatric meds a few weeks ago abruptly. He has had some headaches since then. He did not feel well taking all of the  medications but now notices he is more irritable, with mood swings. He also stopped taking his lisinopril.      Review of Systems   Skin:         Cyst   Neurological:  Positive for headaches. Negative for dizziness and light-headedness.   Psychiatric/Behavioral:  Positive for behavioral problems.        Objective   /76 (BP Location: Right arm, Patient Position: Sitting, Cuff Size: Standard)   Pulse 104   Temp 97.8 °F (36.6 °C) (Tympanic)   Resp 18   Ht 6' (1.829 m)   Wt 95.3 kg (210 lb)   SpO2 97%   BMI 28.48 kg/m²      Physical Exam  Constitutional:       General: He is not in acute distress.  Eyes:      Conjunctiva/sclera: Conjunctivae normal.   Cardiovascular:      Rate and Rhythm: Normal rate and regular rhythm.      Heart sounds: Normal heart sounds.   Pulmonary:      Effort: Pulmonary effort is normal.      Breath sounds: Normal breath sounds.   Musculoskeletal:      Right lower leg: No edema.      Left lower leg: No edema.   Skin:     General: Skin is warm and dry.             Comments: Small healing incision at top of gluteal cleft; no drainage   Neurological:      Mental Status: He is alert.   Psychiatric:         Speech: Speech normal.         Behavior: Behavior normal. Behavior is cooperative.

## 2025-04-14 NOTE — ASSESSMENT & PLAN NOTE
Stopped his Lexapro, Abilify, and Buspar abruptly a few weeks ago. He did not like the way he felt on them but is noticing increased irritability and mood swings. He is agreeable to starting the medications again, but we will plan for him to start 1 medication at a time for 5-7 days before starting the next one to monitor for side effects. Follow up with his Psychiatrist.

## 2025-04-14 NOTE — ASSESSMENT & PLAN NOTE
No signs of infection. Complete course of doxycycline. Follow up with General Surgery for further evaluation/treatment.

## 2025-04-15 ENCOUNTER — TELEPHONE (OUTPATIENT)
Dept: SURGERY | Facility: CLINIC | Age: 20
End: 2025-04-15

## 2025-04-15 NOTE — TELEPHONE ENCOUNTER
FOLLOW UP: Call Father at #[phone]    REASON FOR CONVERSATION: ER Follow up    SYMPTOMS: Pain-Pilonidal Abscess    OTHER: Established patient of . Patient's Father Christian papi. Patient was seen in the ER on 4/10/25 for recurrent pilonidal abscess.   Father is concerned as he is still in pain. Is hoping to get an appointment as soon as possible  DISPOSITION: Discuss with Provider and Call back

## 2025-04-15 NOTE — TELEPHONE ENCOUNTER
Spoke to patient and patient's father gave instructions per Dr. Melendez. They both understood and confirmed.

## 2025-05-01 ENCOUNTER — OFFICE VISIT (OUTPATIENT)
Dept: SURGERY | Facility: CLINIC | Age: 20
End: 2025-05-01
Payer: COMMERCIAL

## 2025-05-01 VITALS
SYSTOLIC BLOOD PRESSURE: 142 MMHG | DIASTOLIC BLOOD PRESSURE: 80 MMHG | RESPIRATION RATE: 18 BRPM | TEMPERATURE: 98.3 F | OXYGEN SATURATION: 99 % | HEART RATE: 78 BPM | BODY MASS INDEX: 28.85 KG/M2 | WEIGHT: 213 LBS | HEIGHT: 72 IN

## 2025-05-01 DIAGNOSIS — L05.91 PILONIDAL CYST: Primary | ICD-10-CM

## 2025-05-01 DIAGNOSIS — L05.01 PILONIDAL ABSCESS: ICD-10-CM

## 2025-05-01 PROCEDURE — 99214 OFFICE O/P EST MOD 30 MIN: CPT | Performed by: STUDENT IN AN ORGANIZED HEALTH CARE EDUCATION/TRAINING PROGRAM

## 2025-05-01 RX ORDER — CHLORHEXIDINE GLUCONATE 40 MG/ML
SOLUTION TOPICAL DAILY PRN
OUTPATIENT
Start: 2025-05-01

## 2025-05-01 NOTE — ASSESSMENT & PLAN NOTE
See plan under pilonidal cyst  Orders:    Ambulatory Referral to General Surgery    Case request operating room: EXCISION PILONIDAL CYST; Standing    CBC and Platelet; Future    Basic metabolic panel; Future

## 2025-05-01 NOTE — ASSESSMENT & PLAN NOTE
20-year-old male with pilonidal cyst  -Patient presents to discuss surgery for pilonidal cyst  - Was recently in the emergency department with infected pilonidal cyst requiring drainage  -Denies any pain or drainage now  -Pilonidal sinus present with well-healed scar next to it, no signs of infection  -Emergency department note from 4/10/2025 reviewed  - Primary care physician note from 4/14/2025 reviewed  -Cardiology note from 2/6/2025 reviewed  - Echocardiogram report from 2/18/2025 reviewed  - Plan for excision of pilonidal cyst  -CBC, BMP ordered    All risks, benefits, alternatives of the procedure were discussed at length with the patient.  Risks include bleeding, infection, damage to surrounding structures, recurrence.  All questions were answered to satisfaction.  The patient voiced understanding and signed consent.    Orders:    Case request operating room: EXCISION PILONIDAL CYST; Standing    CBC and Platelet; Future    Basic metabolic panel; Future

## 2025-05-01 NOTE — PROGRESS NOTES
Name: Dave Estrada      : 2005      MRN: [id number]  Encounter Provider: Sam Melendez DO  Encounter Date: 2025   Encounter department: [facility]  :  Assessment & Plan  Pilonidal cyst  20-year-old male with pilonidal cyst  -Patient presents to discuss surgery for pilonidal cyst  - Was recently in the emergency department with infected pilonidal cyst requiring drainage  -Denies any pain or drainage now  -Pilonidal sinus present with well-healed scar next to it, no signs of infection  -Emergency department note from 4/10/2025 reviewed  - Primary care physician note from 2025 reviewed  -Cardiology note from 2025 reviewed  - Echocardiogram report from 2025 reviewed  - Plan for excision of pilonidal cyst  -CBC, BMP ordered    All risks, benefits, alternatives of the procedure were discussed at length with the patient.  Risks include bleeding, infection, damage to surrounding structures, recurrence.  All questions were answered to satisfaction.  The patient voiced understanding and signed consent.    Orders:    Case request operating room: EXCISION PILONIDAL CYST; Standing    CBC and Platelet; Future    Basic metabolic panel; Future    Pilonidal abscess  See plan under pilonidal cyst  Orders:    Ambulatory Referral to General Surgery    Case request operating room: EXCISION PILONIDAL CYST; Standing    CBC and Platelet; Future    Basic metabolic panel; Future        History of Present Illness   HPI  Dave Estrada is a 20 y.o. male who presents for discussion of excision of pilonidal cyst.  He was recently in the emergency department for incision and drainage of pilonidal cyst and abscess.  States the area has healed well denies any additional drainage.  Denies any pain.  History obtained from: patient    Review of Systems   Constitutional:  Negative for chills, fatigue and fever.   HENT:  Negative for congestion, hearing loss, rhinorrhea and sore throat.     Eyes:  Negative for pain and discharge.   Respiratory:  Negative for cough, chest tightness and shortness of breath.    Cardiovascular:  Negative for chest pain and palpitations.   Gastrointestinal:  Negative for abdominal pain, constipation, diarrhea, nausea and vomiting.   Endocrine: Negative for cold intolerance and heat intolerance.   Genitourinary:  Negative for difficulty urinating and dysuria.   Musculoskeletal:  Negative for back pain and neck pain.   Skin:  Negative for color change and rash.        Positive pilonidal cyst   Allergic/Immunologic: Negative for environmental allergies and food allergies.   Neurological:  Negative for seizures and headaches.   Hematological:  Does not bruise/bleed easily.   Psychiatric/Behavioral:  Negative for confusion and hallucinations.      Medical History Reviewed by provider this encounter:  Tobacco  Allergies  Meds  Problems  Med Hx  Surg Hx  Fam Hx     .  Past Medical History   Past Medical History:   Diagnosis Date    Allergic     Anxiety     Depression      Past Surgical History:   Procedure Laterality Date    CARDIAC CATHETERIZATION Left 10/11/2022    Procedure: Cardiac Left Heart Cath;  Surgeon: Kj Castellanos MD;  Location: [facility];  Service: Cardiology    CARDIAC CATHETERIZATION N/A 10/11/2022    Procedure: Cardiac Coronary Angiogram;  Surgeon: Kj Castellanos MD;  Location: [facility];  Service: Cardiology    WISDOM TOOTH EXTRACTION       History reviewed. No pertinent family history.   reports that he has quit smoking. His smoking use included cigarettes, pipe, and cigars. He has a 4 pack-year smoking history. He has been exposed to tobacco smoke. He has quit using smokeless tobacco.  His smokeless tobacco use included snuff. He reports that he does not currently use alcohol after a past usage of about 2.0 standard drinks of alcohol per week. He reports that he does not currently use drugs after having used the following drugs:  Marijuana.  Current Outpatient Medications   Medication Instructions    ARIPiprazole (ABILIFY) 5 mg, Daily at bedtime    busPIRone (BUSPAR) 30 mg, 2 times daily    escitalopram (LEXAPRO) 20 mg, Daily    hydrOXYzine HCL (ATARAX) 25 mg, Daily PRN    lisinopril (ZESTRIL) 2.5 mg, Oral, Every evening    metoprolol succinate (TOPROL-XL) 25 mg 24 hr tablet TAKE 1 TABLET (25 MG TOTAL) BY MOUTH DAILY.     Allergies   Allergen Reactions    Bactrim [Sulfamethoxazole-Trimethoprim] Rash      Current Outpatient Medications on File Prior to Visit   Medication Sig Dispense Refill    ARIPiprazole (ABILIFY) 5 mg tablet Take 5 mg by mouth daily at bedtime      busPIRone (BUSPAR) 30 MG tablet Take 30 mg by mouth 2 (two) times a day      escitalopram (LEXAPRO) 20 mg tablet Take 20 mg by mouth daily      hydrOXYzine HCL (ATARAX) 25 mg tablet Take 25 mg by mouth daily as needed      lisinopril (ZESTRIL) 2.5 mg tablet Take 1 tablet (2.5 mg total) by mouth every evening 90 tablet 1    metoprolol succinate (TOPROL-XL) 25 mg 24 hr tablet TAKE 1 TABLET (25 MG TOTAL) BY MOUTH DAILY. 90 tablet 1     No current facility-administered medications on file prior to visit.      Social History     Tobacco Use    Smoking status: Former     Current packs/day: 1.00     Average packs/day: 1 pack/day for 4.0 years (4.0 ttl pk-yrs)     Types: Cigarettes, Pipe, Cigars     Passive exposure: Past    Smokeless tobacco: Former     Types: Snuff   Vaping Use    Vaping status: Former    Substances: Nicotine   Substance and Sexual Activity    Alcohol use: Not Currently     Alcohol/week: 2.0 standard drinks of alcohol    Drug use: Not Currently     Types: Marijuana    Sexual activity: Not Currently     Partners: Female     Birth control/protection: Condom Male        Objective   /80 (BP Location: Left arm, Patient Position: Sitting, Cuff Size: Standard)   Pulse 78   Temp 98.3 °F (36.8 °C)   Resp 18   Ht 6' (1.829 m)   Wt 96.6 kg (213 lb)   SpO2 99%   BMI  28.89 kg/m²      Physical Exam  Constitutional:       Appearance: Normal appearance.   HENT:      Head: Normocephalic and atraumatic.      Nose: Nose normal.   Eyes:      General: No scleral icterus.     Conjunctiva/sclera: Conjunctivae normal.   Cardiovascular:      Rate and Rhythm: Normal rate and regular rhythm.      Heart sounds: Normal heart sounds.   Pulmonary:      Effort: Pulmonary effort is normal.      Breath sounds: Normal breath sounds.   Musculoskeletal:         General: No signs of injury.   Skin:     General: Skin is warm.      Coloration: Skin is not jaundiced.      Comments: Pilonidal sinus present with well-healed scar next to it, no signs of infection   Neurological:      General: No focal deficit present.      Mental Status: He is alert and oriented to person, place, and time.   Psychiatric:         Mood and Affect: Mood normal.         Behavior: Behavior normal.

## 2025-05-01 NOTE — H&P (VIEW-ONLY)
Name: Dave Estrada      : 2005      MRN: [id number]  Encounter Provider: Sam Melendez DO  Encounter Date: 2025   Encounter department: [facility]  :  Assessment & Plan  Pilonidal cyst  20-year-old male with pilonidal cyst  -Patient presents to discuss surgery for pilonidal cyst  - Was recently in the emergency department with infected pilonidal cyst requiring drainage  -Denies any pain or drainage now  -Pilonidal sinus present with well-healed scar next to it, no signs of infection  -Emergency department note from 4/10/2025 reviewed  - Primary care physician note from 2025 reviewed  -Cardiology note from 2025 reviewed  - Echocardiogram report from 2025 reviewed  - Plan for excision of pilonidal cyst  -CBC, BMP ordered    All risks, benefits, alternatives of the procedure were discussed at length with the patient.  Risks include bleeding, infection, damage to surrounding structures, recurrence.  All questions were answered to satisfaction.  The patient voiced understanding and signed consent.    Orders:    Case request operating room: EXCISION PILONIDAL CYST; Standing    CBC and Platelet; Future    Basic metabolic panel; Future    Pilonidal abscess  See plan under pilonidal cyst  Orders:    Ambulatory Referral to General Surgery    Case request operating room: EXCISION PILONIDAL CYST; Standing    CBC and Platelet; Future    Basic metabolic panel; Future        History of Present Illness   HPI  Dave Estrada is a 20 y.o. male who presents for discussion of excision of pilonidal cyst.  He was recently in the emergency department for incision and drainage of pilonidal cyst and abscess.  States the area has healed well denies any additional drainage.  Denies any pain.  History obtained from: patient    Review of Systems   Constitutional:  Negative for chills, fatigue and fever.   HENT:  Negative for congestion, hearing loss, rhinorrhea and sore throat.     Eyes:  Negative for pain and discharge.   Respiratory:  Negative for cough, chest tightness and shortness of breath.    Cardiovascular:  Negative for chest pain and palpitations.   Gastrointestinal:  Negative for abdominal pain, constipation, diarrhea, nausea and vomiting.   Endocrine: Negative for cold intolerance and heat intolerance.   Genitourinary:  Negative for difficulty urinating and dysuria.   Musculoskeletal:  Negative for back pain and neck pain.   Skin:  Negative for color change and rash.        Positive pilonidal cyst   Allergic/Immunologic: Negative for environmental allergies and food allergies.   Neurological:  Negative for seizures and headaches.   Hematological:  Does not bruise/bleed easily.   Psychiatric/Behavioral:  Negative for confusion and hallucinations.      Medical History Reviewed by provider this encounter:  Tobacco  Allergies  Meds  Problems  Med Hx  Surg Hx  Fam Hx     .  Past Medical History   Past Medical History:   Diagnosis Date    Allergic     Anxiety     Depression      Past Surgical History:   Procedure Laterality Date    CARDIAC CATHETERIZATION Left 10/11/2022    Procedure: Cardiac Left Heart Cath;  Surgeon: Kj Castellanos MD;  Location: [facility];  Service: Cardiology    CARDIAC CATHETERIZATION N/A 10/11/2022    Procedure: Cardiac Coronary Angiogram;  Surgeon: Kj Castellanos MD;  Location: [facility];  Service: Cardiology    WISDOM TOOTH EXTRACTION       History reviewed. No pertinent family history.   reports that he has quit smoking. His smoking use included cigarettes, pipe, and cigars. He has a 4 pack-year smoking history. He has been exposed to tobacco smoke. He has quit using smokeless tobacco.  His smokeless tobacco use included snuff. He reports that he does not currently use alcohol after a past usage of about 2.0 standard drinks of alcohol per week. He reports that he does not currently use drugs after having used the following drugs:  Marijuana.  Current Outpatient Medications   Medication Instructions    ARIPiprazole (ABILIFY) 5 mg, Daily at bedtime    busPIRone (BUSPAR) 30 mg, 2 times daily    escitalopram (LEXAPRO) 20 mg, Daily    hydrOXYzine HCL (ATARAX) 25 mg, Daily PRN    lisinopril (ZESTRIL) 2.5 mg, Oral, Every evening    metoprolol succinate (TOPROL-XL) 25 mg 24 hr tablet TAKE 1 TABLET (25 MG TOTAL) BY MOUTH DAILY.     Allergies   Allergen Reactions    Bactrim [Sulfamethoxazole-Trimethoprim] Rash      Current Outpatient Medications on File Prior to Visit   Medication Sig Dispense Refill    ARIPiprazole (ABILIFY) 5 mg tablet Take 5 mg by mouth daily at bedtime      busPIRone (BUSPAR) 30 MG tablet Take 30 mg by mouth 2 (two) times a day      escitalopram (LEXAPRO) 20 mg tablet Take 20 mg by mouth daily      hydrOXYzine HCL (ATARAX) 25 mg tablet Take 25 mg by mouth daily as needed      lisinopril (ZESTRIL) 2.5 mg tablet Take 1 tablet (2.5 mg total) by mouth every evening 90 tablet 1    metoprolol succinate (TOPROL-XL) 25 mg 24 hr tablet TAKE 1 TABLET (25 MG TOTAL) BY MOUTH DAILY. 90 tablet 1     No current facility-administered medications on file prior to visit.      Social History     Tobacco Use    Smoking status: Former     Current packs/day: 1.00     Average packs/day: 1 pack/day for 4.0 years (4.0 ttl pk-yrs)     Types: Cigarettes, Pipe, Cigars     Passive exposure: Past    Smokeless tobacco: Former     Types: Snuff   Vaping Use    Vaping status: Former    Substances: Nicotine   Substance and Sexual Activity    Alcohol use: Not Currently     Alcohol/week: 2.0 standard drinks of alcohol    Drug use: Not Currently     Types: Marijuana    Sexual activity: Not Currently     Partners: Female     Birth control/protection: Condom Male        Objective   /80 (BP Location: Left arm, Patient Position: Sitting, Cuff Size: Standard)   Pulse 78   Temp 98.3 °F (36.8 °C)   Resp 18   Ht 6' (1.829 m)   Wt 96.6 kg (213 lb)   SpO2 99%   BMI  28.89 kg/m²      Physical Exam  Constitutional:       Appearance: Normal appearance.   HENT:      Head: Normocephalic and atraumatic.      Nose: Nose normal.   Eyes:      General: No scleral icterus.     Conjunctiva/sclera: Conjunctivae normal.   Cardiovascular:      Rate and Rhythm: Normal rate and regular rhythm.      Heart sounds: Normal heart sounds.   Pulmonary:      Effort: Pulmonary effort is normal.      Breath sounds: Normal breath sounds.   Musculoskeletal:         General: No signs of injury.   Skin:     General: Skin is warm.      Coloration: Skin is not jaundiced.      Comments: Pilonidal sinus present with well-healed scar next to it, no signs of infection   Neurological:      General: No focal deficit present.      Mental Status: He is alert and oriented to person, place, and time.   Psychiatric:         Mood and Affect: Mood normal.         Behavior: Behavior normal.

## 2025-05-05 ENCOUNTER — APPOINTMENT (OUTPATIENT)
Age: 20
End: 2025-05-05
Payer: COMMERCIAL

## 2025-05-05 DIAGNOSIS — L05.01 PILONIDAL ABSCESS: ICD-10-CM

## 2025-05-05 DIAGNOSIS — L05.91 PILONIDAL CYST: ICD-10-CM

## 2025-05-05 DIAGNOSIS — Z13.228 SCREENING FOR METABOLIC DISORDER: ICD-10-CM

## 2025-05-05 LAB
ALBUMIN SERPL BCG-MCNC: 4.4 G/DL (ref 3.5–5)
ALP SERPL-CCNC: 37 U/L (ref 34–104)
ALT SERPL W P-5'-P-CCNC: 49 U/L (ref 7–52)
ANION GAP SERPL CALCULATED.3IONS-SCNC: 10 MMOL/L (ref 4–13)
AST SERPL W P-5'-P-CCNC: 29 U/L (ref 13–39)
BASOPHILS # BLD AUTO: 0.06 THOUSANDS/ÂΜL (ref 0–0.1)
BASOPHILS NFR BLD AUTO: 1 % (ref 0–1)
BILIRUB SERPL-MCNC: 1.27 MG/DL (ref 0.2–1)
BUN SERPL-MCNC: 14 MG/DL (ref 5–25)
CALCIUM SERPL-MCNC: 9.4 MG/DL (ref 8.4–10.2)
CHLORIDE SERPL-SCNC: 104 MMOL/L (ref 96–108)
CHOLEST SERPL-MCNC: 119 MG/DL (ref ?–200)
CO2 SERPL-SCNC: 27 MMOL/L (ref 21–32)
CREAT SERPL-MCNC: 1 MG/DL (ref 0.6–1.3)
EOSINOPHIL # BLD AUTO: 0.29 THOUSAND/ÂΜL (ref 0–0.61)
EOSINOPHIL NFR BLD AUTO: 4 % (ref 0–6)
ERYTHROCYTE [DISTWIDTH] IN BLOOD BY AUTOMATED COUNT: 13.1 % (ref 11.6–15.1)
GFR SERPL CREATININE-BSD FRML MDRD: 107 ML/MIN/1.73SQ M
GLUCOSE P FAST SERPL-MCNC: 104 MG/DL (ref 65–99)
HCT VFR BLD AUTO: 46.8 % (ref 36.5–49.3)
HDLC SERPL-MCNC: 39 MG/DL
HGB BLD-MCNC: 15.1 G/DL (ref 12–17)
IMM GRANULOCYTES # BLD AUTO: 0.02 THOUSAND/UL (ref 0–0.2)
IMM GRANULOCYTES NFR BLD AUTO: 0 % (ref 0–2)
LDLC SERPL CALC-MCNC: 44 MG/DL (ref 0–100)
LYMPHOCYTES # BLD AUTO: 2.76 THOUSANDS/ÂΜL (ref 0.6–4.47)
LYMPHOCYTES NFR BLD AUTO: 40 % (ref 14–44)
MCH RBC QN AUTO: 28.3 PG (ref 26.8–34.3)
MCHC RBC AUTO-ENTMCNC: 32.3 G/DL (ref 31.4–37.4)
MCV RBC AUTO: 88 FL (ref 82–98)
MONOCYTES # BLD AUTO: 0.98 THOUSAND/ÂΜL (ref 0.17–1.22)
MONOCYTES NFR BLD AUTO: 14 % (ref 4–12)
NEUTROPHILS # BLD AUTO: 2.88 THOUSANDS/ÂΜL (ref 1.85–7.62)
NEUTS SEG NFR BLD AUTO: 41 % (ref 43–75)
NONHDLC SERPL-MCNC: 80 MG/DL
NRBC BLD AUTO-RTO: 0 /100 WBCS
PLATELET # BLD AUTO: 229 THOUSANDS/UL (ref 149–390)
PMV BLD AUTO: 9.2 FL (ref 8.9–12.7)
POTASSIUM SERPL-SCNC: 4 MMOL/L (ref 3.5–5.3)
PROT SERPL-MCNC: 6.7 G/DL (ref 6.4–8.4)
RBC # BLD AUTO: 5.34 MILLION/UL (ref 3.88–5.62)
SODIUM SERPL-SCNC: 141 MMOL/L (ref 135–147)
TRIGL SERPL-MCNC: 178 MG/DL (ref ?–150)
WBC # BLD AUTO: 6.99 THOUSAND/UL (ref 4.31–10.16)

## 2025-05-05 PROCEDURE — 36415 COLL VENOUS BLD VENIPUNCTURE: CPT

## 2025-05-05 PROCEDURE — 85025 COMPLETE CBC W/AUTO DIFF WBC: CPT

## 2025-05-05 PROCEDURE — 80061 LIPID PANEL: CPT

## 2025-05-05 PROCEDURE — 80053 COMPREHEN METABOLIC PANEL: CPT

## 2025-05-05 NOTE — PRE-PROCEDURE INSTRUCTIONS
Pre-Surgery Instructions:   Medication Instructions    ARIPiprazole (ABILIFY) 5 mg tablet Take as directed    busPIRone (BUSPAR) 30 MG tablet Take as directed    escitalopram (LEXAPRO) 20 mg tablet Take as directed    hydrOXYzine HCL (ATARAX) 25 mg tablet Take as directed    lisinopril (ZESTRIL) 2.5 mg tablet Take night before surgery    metoprolol succinate (TOPROL-XL) 25 mg 24 hr tablet Take as directed        Medication instructions for day of surgery reviewed. Please take all instructed medications with only a sip of water.       You will receive a call one business day prior to surgery with an arrival time and hospital directions. If your surgery is scheduled on a Monday, the hospital will be calling you on the Friday prior to your surgery. If you have not heard from anyone by 8pm, please call the hospital supervisor through the hospital  at [phone]. ([place] [phone] or [place] [phone]).    Do not eat or drink anything after midnight the night before your surgery, including candy, mints, lifesavers, or chewing gum. Do not drink alcohol 24hrs before your surgery. Try not to smoke at least 24hrs before your surgery.       Follow the pre surgery showering instructions as listed in the “My Surgical Experience Booklet” or otherwise provided by your surgeon's office. Do not use a blade to shave the surgical area 1 week before surgery. It is okay to use a clean electric clippers up to 24 hours before surgery. Do not apply any lotions, creams, including makeup, cologne, deodorant, or perfumes after showering on the day of your surgery. Do not use dry shampoo, hair spray, hair gel, or any type of hair products.     No contact lenses, eye make-up, or artificial eyelashes. Remove nail polish, including gel polish, and any artificial, gel, or acrylic nails if possible. Remove all jewelry including rings and body piercing jewelry.     Wear causal clothing that is easy to take on and off. Consider  your type of surgery.    Keep any valuables, jewelry, piercings at home. Please bring any specially ordered equipment (sling, braces) if indicated.    Arrange for a responsible person to drive you to and from the hospital on the day of your surgery. Please confirm the visitor policy for the day of your procedure when you receive your phone call with an arrival time.     Call the surgeon's office with any new illnesses, exposures, or additional questions prior to surgery.    Please reference your “My Surgical Experience Booklet” for additional information to prepare for your upcoming surgery.

## 2025-05-06 ENCOUNTER — RESULTS FOLLOW-UP (OUTPATIENT)
Age: 20
End: 2025-05-06

## 2025-05-06 DIAGNOSIS — R73.01 IMPAIRED FASTING GLUCOSE: Primary | ICD-10-CM

## 2025-05-09 ENCOUNTER — HOSPITAL ENCOUNTER (EMERGENCY)
Facility: HOSPITAL | Age: 20
Discharge: HOME | End: 2025-05-10
Attending: EMERGENCY MEDICINE
Payer: COMMERCIAL

## 2025-05-09 ENCOUNTER — APPOINTMENT (EMERGENCY)
Dept: RADIOLOGY | Facility: HOSPITAL | Age: 20
End: 2025-05-09
Payer: COMMERCIAL

## 2025-05-09 VITALS
OXYGEN SATURATION: 100 % | HEART RATE: 75 BPM | DIASTOLIC BLOOD PRESSURE: 80 MMHG | SYSTOLIC BLOOD PRESSURE: 114 MMHG | TEMPERATURE: 98.7 F | RESPIRATION RATE: 17 BRPM

## 2025-05-09 DIAGNOSIS — R07.9 CHEST PAIN, UNSPECIFIED: Primary | ICD-10-CM

## 2025-05-09 LAB
ALBUMIN SERPL BCG-MCNC: 5 G/DL (ref 3.5–5)
ALP SERPL-CCNC: 42 U/L (ref 34–104)
ALT SERPL W P-5'-P-CCNC: 57 U/L (ref 7–52)
ANION GAP SERPL CALCULATED.3IONS-SCNC: 8 MMOL/L (ref 4–13)
AST SERPL W P-5'-P-CCNC: 29 U/L (ref 13–39)
BASOPHILS # BLD AUTO: 0.06 THOUSANDS/ÂΜL (ref 0–0.1)
BASOPHILS NFR BLD AUTO: 1 % (ref 0–1)
BILIRUB SERPL-MCNC: 0.65 MG/DL (ref 0.2–1)
BUN SERPL-MCNC: 13 MG/DL (ref 5–25)
CALCIUM SERPL-MCNC: 10.3 MG/DL (ref 8.4–10.2)
CARDIAC TROPONIN I PNL SERPL HS: 4 NG/L (ref ?–50)
CHLORIDE SERPL-SCNC: 101 MMOL/L (ref 96–108)
CO2 SERPL-SCNC: 29 MMOL/L (ref 21–32)
CREAT SERPL-MCNC: 1.15 MG/DL (ref 0.6–1.3)
D DIMER PPP FEU-MCNC: <0.27 UG/ML FEU
EOSINOPHIL # BLD AUTO: 0.46 THOUSAND/ÂΜL (ref 0–0.61)
EOSINOPHIL NFR BLD AUTO: 4 % (ref 0–6)
ERYTHROCYTE [DISTWIDTH] IN BLOOD BY AUTOMATED COUNT: 13 % (ref 11.6–15.1)
GFR SERPL CREATININE-BSD FRML MDRD: 90 ML/MIN/1.73SQ M
GLUCOSE SERPL-MCNC: 79 MG/DL (ref 65–140)
HCT VFR BLD AUTO: 49.7 % (ref 36.5–49.3)
HGB BLD-MCNC: 16.8 G/DL (ref 12–17)
IMM GRANULOCYTES # BLD AUTO: 0.03 THOUSAND/UL (ref 0–0.2)
IMM GRANULOCYTES NFR BLD AUTO: 0 % (ref 0–2)
LYMPHOCYTES # BLD AUTO: 3.9 THOUSANDS/ÂΜL (ref 0.6–4.47)
LYMPHOCYTES NFR BLD AUTO: 37 % (ref 14–44)
MCH RBC QN AUTO: 28.4 PG (ref 26.8–34.3)
MCHC RBC AUTO-ENTMCNC: 33.8 G/DL (ref 31.4–37.4)
MCV RBC AUTO: 84 FL (ref 82–98)
MONOCYTES # BLD AUTO: 1.26 THOUSAND/ÂΜL (ref 0.17–1.22)
MONOCYTES NFR BLD AUTO: 12 % (ref 4–12)
NEUTROPHILS # BLD AUTO: 4.81 THOUSANDS/ÂΜL (ref 1.85–7.62)
NEUTS SEG NFR BLD AUTO: 46 % (ref 43–75)
NRBC BLD AUTO-RTO: 0 /100 WBCS
PLATELET # BLD AUTO: 222 THOUSANDS/UL (ref 149–390)
PMV BLD AUTO: 8.5 FL (ref 8.9–12.7)
POTASSIUM SERPL-SCNC: 3.8 MMOL/L (ref 3.5–5.3)
PROT SERPL-MCNC: 7.4 G/DL (ref 6.4–8.4)
RBC # BLD AUTO: 5.92 MILLION/UL (ref 3.88–5.62)
SODIUM SERPL-SCNC: 138 MMOL/L (ref 135–147)
WBC # BLD AUTO: 10.52 THOUSAND/UL (ref 4.31–10.16)

## 2025-05-09 PROCEDURE — 85025 COMPLETE CBC W/AUTO DIFF WBC: CPT

## 2025-05-09 PROCEDURE — 85379 FIBRIN DEGRADATION QUANT: CPT | Performed by: EMERGENCY MEDICINE

## 2025-05-09 PROCEDURE — 71046 X-RAY EXAM CHEST 2 VIEWS: CPT

## 2025-05-09 PROCEDURE — 80053 COMPREHEN METABOLIC PANEL: CPT

## 2025-05-09 PROCEDURE — 99285 EMERGENCY DEPT VISIT HI MDM: CPT | Performed by: EMERGENCY MEDICINE

## 2025-05-09 PROCEDURE — 99285 EMERGENCY DEPT VISIT HI MDM: CPT

## 2025-05-09 PROCEDURE — 36415 COLL VENOUS BLD VENIPUNCTURE: CPT

## 2025-05-09 PROCEDURE — 93005 ELECTROCARDIOGRAM TRACING: CPT

## 2025-05-09 PROCEDURE — 84484 ASSAY OF TROPONIN QUANT: CPT

## 2025-05-09 RX ORDER — LORAZEPAM 1 MG/1
2 TABLET ORAL ONCE
Status: COMPLETED | OUTPATIENT
Start: 2025-05-09 | End: 2025-05-09

## 2025-05-09 RX ADMIN — LORAZEPAM 2 MG: 1 TABLET ORAL at 22:57

## 2025-05-10 NOTE — ED PROVIDER NOTES
Time reflects when diagnosis was documented in both MDM as applicable and the Disposition within this note       Time User Action Codes Description Comment    5/10/2025 12:04 AM Kendall Hicks Add [R07.9] Chest pain, unspecified           ED Disposition       ED Disposition   Discharge    Condition   Stable    Date/Time   Sat May 10, 2025 12:04 AM    Comment   Dave Estrada discharge to home/self care.                   Assessment & Plan       Medical Decision Making  EKG was without ischemia.  Troponin was negative.  Heart score is low.  Doubt acute coronary syndrome.  Review of cardiology note reports that patient has a history of normal coronaries.  No back pain or migration to suggest dissection.  D-dimer is negative making pulmonary embolism unlikely.  Chest x-ray is negative for pneumonia or pneumothorax.  No EKG or troponin evidence of pericarditis or myocarditis.  Most likely this is anxiety.  Patient does feel improved after ED treatment.  Appropriate for discharge and outpatient management.    Amount and/or Complexity of Data Reviewed  Independent Historian:      Details: Father  Labs: ordered. Decision-making details documented in ED Course.  Radiology: ordered and independent interpretation performed. Decision-making details documented in ED Course.  ECG/medicine tests: ordered and independent interpretation performed. Decision-making details documented in ED Course.    Risk  Prescription drug management.  Decision regarding hospitalization.             Medications   LORazepam (ATIVAN) tablet 2 mg (2 mg Oral Given 5/9/25 9834)       ED Risk Strat Scores   HEART Risk Score      Flowsheet Row Most Recent Value   Heart Score Risk Calculator    History 0 Filed at: 05/10/2025 0003   ECG 0 Filed at: 05/10/2025 0003   Age 0 Filed at: 05/10/2025 0003   Risk Factors 1 Filed at: 05/10/2025 0003   Troponin 0 Filed at: 05/10/2025 0003   HEART Score 1 Filed at: 05/10/2025 0003          HEART Risk Score       Flowsheet Row Most Recent Value   Heart Score Risk Calculator    History 0 Filed at: 05/10/2025 0003   ECG 0 Filed at: 05/10/2025 0003   Age 0 Filed at: 05/10/2025 0003   Risk Factors 1 Filed at: 05/10/2025 0003   Troponin 0 Filed at: 05/10/2025 0003   HEART Score 1 Filed at: 05/10/2025 0003                      No data recorded        SBIRT 20yo+      Flowsheet Row Most Recent Value   Initial Alcohol Screen: US AUDIT-C     1. How often do you have a drink containing alcohol? 0 Filed at: 05/09/2025 2133   2. How many drinks containing alcohol do you have on a typical day you are drinking?  0 Filed at: 05/09/2025 2133   3a. Male UNDER 65: How often do you have five or more drinks on one occasion? 0 Filed at: 05/09/2025 2133   Audit-C Score 0 Filed at: 05/09/2025 2133   LEE: How many times in the past year have you...    Used an illegal drug or used a prescription medication for non-medical reasons? Never Filed at: 05/09/2025 2133                            History of Present Illness       Chief Complaint   Patient presents with    Chest Pain     Pt c/o left chest pain/tightness that started 1 hr ago, pt has heart hx and recently stopped taking his medications        Past Medical History:   Diagnosis Date    Allergic     Anxiety     Depression     Hypertension       Past Surgical History:   Procedure Laterality Date    CARDIAC CATHETERIZATION Left 10/11/2022    Procedure: Cardiac Left Heart Cath;  Surgeon: Kj Castellanos MD;  Location: [facility];  Service: Cardiology    CARDIAC CATHETERIZATION N/A 10/11/2022    Procedure: Cardiac Coronary Angiogram;  Surgeon: Kj Castellanos MD;  Location: [facility];  Service: Cardiology    WISDOM TOOTH EXTRACTION        History reviewed. No pertinent family history.   Social History     Tobacco Use    Smoking status: Former     Current packs/day: 1.00     Average packs/day: 1 pack/day for 4.0 years (4.0 ttl pk-yrs)     Types: Cigarettes, Pipe, Cigars      Passive exposure: Past    Smokeless tobacco: Former     Types: Snuff   Vaping Use    Vaping status: Former    Substances: Nicotine   Substance Use Topics    Alcohol use: Not Currently     Alcohol/week: 2.0 standard drinks of alcohol     Types: 2 Standard drinks or equivalent per week     Comment: rarely    Drug use: Not Currently     Types: Marijuana      E-Cigarette/Vaping    E-Cigarette Use Former User       E-Cigarette/Vaping Substances    Nicotine Yes     THC No     CBD No     Flavoring No     Other No     Unknown No       I have reviewed and agree with the history as documented.     Patient is a 20-year-old male.  History of mental health.  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  Normal coronaries.  Possibly COVID-related.  Echo from 2022 showed ejection fraction of 40 to 45%.  History of PVCs on Holter monitor.  Cardiac MRI did not show inflammation.  Had frequent PVCs that was being treated with beta-blocker.  Echo done in February of 2025 showed normal left ventricle with 50% ejection fraction.  Patient has been recently noncompliant with his mental health medication and cardiac medications.  Patient presents today with chest pain that started around 8 PM.  It is a chest tightness.  He does report some shortness of breath.  No cough or hemoptysis.  No calf pain unilateral leg swelling.  No personal history of coronary artery disease or thromboembolic disease.  The pain is nonradiating.  It is not exertional.  Patient admits that it could be anxiety.  Patient does drink EtOH.  Denies current drug use.  Cardiac risk factors only include hypercholesterolemia.  No hypertension or diabetes.  No family history of early coronary artery disease.  Patient is a non-smoker.        Review of Systems   Constitutional:  Negative for chills and fever.   HENT:  Negative for rhinorrhea and sore throat.    Eyes:  Negative for pain, redness and visual disturbance.   Respiratory:  Positive for shortness of  breath. Negative for cough.    Cardiovascular:  Positive for chest pain. Negative for leg swelling.   Gastrointestinal:  Negative for abdominal pain, diarrhea and vomiting.   Endocrine: Negative for polydipsia and polyuria.   Genitourinary:  Negative for dysuria, frequency and hematuria.   Musculoskeletal:  Negative for back pain and neck pain.   Skin:  Negative for rash and wound.   Allergic/Immunologic: Negative for immunocompromised state.   Neurological:  Negative for weakness, numbness and headaches.   Psychiatric/Behavioral:  Negative for hallucinations and suicidal ideas.    All other systems reviewed and are negative.          Objective       ED Triage Vitals [05/09/25 2132]   Temperature Pulse Blood Pressure Respirations SpO2 Patient Position - Orthostatic VS   98.7 °F (37.1 °C) 75 114/80 17 100 % Sitting      Temp Source Heart Rate Source BP Location FiO2 (%) Pain Score    Oral Monitor Left arm -- --      Vitals      Date and Time Temp Pulse SpO2 Resp BP Pain Score FACES Pain Rating User   05/09/25 2132 98.7 °F (37.1 °C) 75 100 % 17 114/80 -- -- GB            Physical Exam  Vitals reviewed.   Constitutional:       General: He is not in acute distress.     Appearance: He is not toxic-appearing.   HENT:      Head: Normocephalic and atraumatic.      Nose: Nose normal.      Mouth/Throat:      Mouth: Mucous membranes are moist.   Eyes:      General:         Right eye: No discharge.         Left eye: No discharge.      Conjunctiva/sclera: Conjunctivae normal.   Cardiovascular:      Rate and Rhythm: Normal rate and regular rhythm.      Pulses: Normal pulses.      Heart sounds: Normal heart sounds. No murmur heard.     No friction rub. No gallop.   Pulmonary:      Effort: Pulmonary effort is normal. No respiratory distress.      Breath sounds: Normal breath sounds. No stridor. No wheezing, rhonchi or rales.   Abdominal:      General: Bowel sounds are normal. There is no distension.      Palpations: Abdomen is  soft.      Tenderness: There is no abdominal tenderness. There is no right CVA tenderness, left CVA tenderness, guarding or rebound.   Musculoskeletal:         General: No swelling, tenderness, deformity or signs of injury. Normal range of motion.      Cervical back: Normal range of motion and neck supple. No rigidity.      Right lower leg: No edema.      Left lower leg: No edema.      Comments: No calf pain or unilateral leg swelling   Skin:     General: Skin is warm and dry.      Coloration: Skin is not jaundiced or pale.      Findings: No bruising, erythema or rash.   Neurological:      General: No focal deficit present.      Mental Status: He is alert and oriented to person, place, and time.      Cranial Nerves: No facial asymmetry.      Sensory: No sensory deficit.      Motor: Motor function is intact.   Psychiatric:         Mood and Affect: Mood normal.         Behavior: Behavior normal.         Results Reviewed       Procedure Component Value Units Date/Time    D-Dimer [[id number]]  (Normal) Collected: 05/09/25 2257    Lab Status: Final result Specimen: Blood from Arm, Right Updated: 05/09/25 2326     D-Dimer, Quant <0.27 ug/ml [facility]     Comprehensive metabolic panel [[id number]]  (Abnormal) Collected: 05/09/25 2224    Lab Status: Final result Specimen: Blood from Arm, Right Updated: 05/09/25 2308     Sodium 138 mmol/L      Potassium 3.8 mmol/L      Chloride 101 mmol/L      CO2 29 mmol/L      ANION GAP 8 mmol/L      BUN 13 mg/dL      Creatinine 1.15 mg/dL      Glucose 79 mg/dL      Calcium 10.3 mg/dL      AST 29 U/L      ALT 57 U/L      Alkaline Phosphatase 42 U/L      Total Protein 7.4 g/dL      Albumin 5.0 g/dL      Total Bilirubin 0.65 mg/dL      eGFR 90 ml/min/1.73sq m     Narrative:      National Kid[employer] guidelines for Chronic Kidney Disease (CKD):     Stage 1 with normal or high GFR (GFR > 90 mL/min/1.73 square meters)    Stage 2 Mild CKD (GFR = 60-89 mL/min/1.73 square meters)    Stage 3A  Moderate CKD (GFR = 45-59 mL/min/1.73 square meters)    Stage 3B Moderate CKD (GFR = 30-44 mL/min/1.73 square meters)    Stage 4 Severe CKD (GFR = 15-29 mL/min/1.73 square meters)    Stage 5 End Stage CKD (GFR <15 mL/min/1.73 square meters)  Note: GFR calculation is accurate only with a steady state creatinine    HS Troponin 0hr (reflex protocol) [[id number]]  (Normal) Collected: 05/09/25 2224    Lab Status: Final result Specimen: Blood from Arm, Right Updated: 05/09/25 2252     hs TnI 0hr 4 ng/L     CBC and differential [[id number]]  (Abnormal) Collected: 05/09/25 2224    Lab Status: Final result Specimen: Blood from Arm, Right Updated: 05/09/25 2233     WBC 10.52 Thousand/uL      RBC 5.92 Million/uL      Hemoglobin 16.8 g/dL      Hematocrit 49.7 %      MCV 84 fL      MCH 28.4 pg      MCHC 33.8 g/dL      RDW 13.0 %      MPV 8.5 fL      Platelets 222 Thousands/uL      nRBC 0 /100 WBCs      Segmented % 46 %      Immature Grans % 0 %      Lymphocytes % 37 %      Monocytes % 12 %      Eosinophils Relative 4 %      Basophils Relative 1 %      Absolute Neutrophils 4.81 Thousands/µL      Absolute Immature Grans 0.03 Thousand/uL      Absolute Lymphocytes 3.90 Thousands/µL      Absolute Monocytes 1.26 Thousand/µL      Eosinophils Absolute 0.46 Thousand/µL      Basophils Absolute 0.06 Thousands/µL             XR chest 2 views   ED Interpretation by Kendall Hicks MD (05/09 5761)   No infiltrates.  No CHF.  No pneumothorax.  No widened mediastinum.          ECG 12 Lead Documentation Only    Date/Time: 5/9/2025 10:40 PM    Performed by: Kendall Hicks MD  Authorized by: Kendall Hicks MD    ECG reviewed by me, the ED Provider: yes    Patient location:  ED  Comments:      Normal sinus rhythm with occasional PVCs.  Right axis deviation.  Poor R wave progression.  Similar to prior EKG.  Abnormal EKG.  No STEMI.      ED Medication and Procedure Management   Prior to Admission Medications   Prescriptions Last Dose Informant  Patient Reported? Taking?   ARIPiprazole (ABILIFY) 5 mg tablet  Self, Father Yes No   Sig: Take 5 mg by mouth daily at bedtime   busPIRone (BUSPAR) 30 MG tablet  Self, Father Yes No   Sig: Take 30 mg by mouth 2 (two) times a day   escitalopram (LEXAPRO) 20 mg tablet  Self, Father Yes No   Sig: Take 20 mg by mouth daily   hydrOXYzine HCL (ATARAX) 25 mg tablet  Self, Father Yes No   Sig: Take 25 mg by mouth daily as needed   lisinopril (ZESTRIL) 2.5 mg tablet  Self No No   Sig: Take 1 tablet (2.5 mg total) by mouth every evening   metoprolol succinate (TOPROL-XL) 25 mg 24 hr tablet  Self No No   Sig: TAKE 1 TABLET (25 MG TOTAL) BY MOUTH DAILY.      Facility-Administered Medications: None     Patient's Medications   Discharge Prescriptions    No medications on file     No discharge procedures on file.  ED SEPSIS DOCUMENTATION   Time reflects when diagnosis was documented in both MDM as applicable and the Disposition within this note       Time User Action Codes Description Comment    5/10/2025 12:04 AM Kendall Hicks Add [R07.9] Chest pain, unspecified                  Kendall Hicks MD  05/10/25 0004

## 2025-05-12 LAB
ATRIAL RATE: 85 BPM
P AXIS: 73 DEGREES
PR INTERVAL: 116 MS
QRS AXIS: 147 DEGREES
QRSD INTERVAL: 96 MS
QT INTERVAL: 374 MS
QTC INTERVAL: 445 MS
T WAVE AXIS: 46 DEGREES
VENTRICULAR RATE: 85 BPM

## 2025-05-12 PROCEDURE — 93010 ELECTROCARDIOGRAM REPORT: CPT | Performed by: INTERNAL MEDICINE

## 2025-05-13 ENCOUNTER — ANESTHESIA EVENT (OUTPATIENT)
Dept: PERIOP | Facility: HOSPITAL | Age: 20
End: 2025-05-13
Payer: COMMERCIAL

## 2025-05-14 ENCOUNTER — ANESTHESIA (OUTPATIENT)
Dept: PERIOP | Facility: HOSPITAL | Age: 20
End: 2025-05-14
Payer: COMMERCIAL

## 2025-05-14 ENCOUNTER — HOSPITAL ENCOUNTER (OUTPATIENT)
Facility: HOSPITAL | Age: 20
Setting detail: OUTPATIENT SURGERY
Discharge: HOME | End: 2025-05-14
Attending: STUDENT IN AN ORGANIZED HEALTH CARE EDUCATION/TRAINING PROGRAM | Admitting: STUDENT IN AN ORGANIZED HEALTH CARE EDUCATION/TRAINING PROGRAM
Payer: COMMERCIAL

## 2025-05-14 VITALS
TEMPERATURE: 97.8 F | OXYGEN SATURATION: 96 % | HEIGHT: 72 IN | HEART RATE: 96 BPM | WEIGHT: 208.56 LBS | RESPIRATION RATE: 16 BRPM | SYSTOLIC BLOOD PRESSURE: 117 MMHG | BODY MASS INDEX: 28.25 KG/M2 | DIASTOLIC BLOOD PRESSURE: 67 MMHG

## 2025-05-14 DIAGNOSIS — L05.01 PILONIDAL ABSCESS: ICD-10-CM

## 2025-05-14 DIAGNOSIS — L05.91 PILONIDAL CYST: Primary | ICD-10-CM

## 2025-05-14 PROCEDURE — 88304 TISSUE EXAM BY PATHOLOGIST: CPT | Performed by: STUDENT IN AN ORGANIZED HEALTH CARE EDUCATION/TRAINING PROGRAM

## 2025-05-14 PROCEDURE — 11772 EXC PILONIDAL CYST COMP: CPT | Performed by: CLINICAL NURSE SPECIALIST

## 2025-05-14 PROCEDURE — 11772 EXC PILONIDAL CYST COMP: CPT | Performed by: STUDENT IN AN ORGANIZED HEALTH CARE EDUCATION/TRAINING PROGRAM

## 2025-05-14 RX ORDER — ONDANSETRON 2 MG/ML
4 INJECTION INTRAMUSCULAR; INTRAVENOUS ONCE AS NEEDED
Status: DISCONTINUED | OUTPATIENT
Start: 2025-05-14 | End: 2025-05-14 | Stop reason: HOSPADM

## 2025-05-14 RX ORDER — DEXAMETHASONE SODIUM PHOSPHATE 10 MG/ML
INJECTION, SOLUTION INTRAMUSCULAR; INTRAVENOUS AS NEEDED
Status: DISCONTINUED | OUTPATIENT
Start: 2025-05-14 | End: 2025-05-14

## 2025-05-14 RX ORDER — CEFAZOLIN SODIUM 2 G/50ML
2000 SOLUTION INTRAVENOUS ONCE
Status: COMPLETED | OUTPATIENT
Start: 2025-05-14 | End: 2025-05-14

## 2025-05-14 RX ORDER — HYDROMORPHONE HCL/PF 1 MG/ML
0.5 SYRINGE (ML) INJECTION
Status: DISCONTINUED | OUTPATIENT
Start: 2025-05-14 | End: 2025-05-14 | Stop reason: HOSPADM

## 2025-05-14 RX ORDER — MIDAZOLAM HYDROCHLORIDE 2 MG/2ML
INJECTION, SOLUTION INTRAMUSCULAR; INTRAVENOUS AS NEEDED
Status: DISCONTINUED | OUTPATIENT
Start: 2025-05-14 | End: 2025-05-14

## 2025-05-14 RX ORDER — LIDOCAINE HYDROCHLORIDE 20 MG/ML
INJECTION, SOLUTION EPIDURAL; INFILTRATION; INTRACAUDAL; PERINEURAL AS NEEDED
Status: DISCONTINUED | OUTPATIENT
Start: 2025-05-14 | End: 2025-05-14

## 2025-05-14 RX ORDER — FENTANYL CITRATE 50 UG/ML
INJECTION, SOLUTION INTRAMUSCULAR; INTRAVENOUS AS NEEDED
Status: DISCONTINUED | OUTPATIENT
Start: 2025-05-14 | End: 2025-05-14

## 2025-05-14 RX ORDER — FENTANYL CITRATE/PF 50 MCG/ML
50 SYRINGE (ML) INJECTION
Status: DISCONTINUED | OUTPATIENT
Start: 2025-05-14 | End: 2025-05-14 | Stop reason: HOSPADM

## 2025-05-14 RX ORDER — ROCURONIUM BROMIDE 10 MG/ML
INJECTION, SOLUTION INTRAVENOUS AS NEEDED
Status: DISCONTINUED | OUTPATIENT
Start: 2025-05-14 | End: 2025-05-14

## 2025-05-14 RX ORDER — PROPOFOL 10 MG/ML
INJECTION, EMULSION INTRAVENOUS AS NEEDED
Status: DISCONTINUED | OUTPATIENT
Start: 2025-05-14 | End: 2025-05-14

## 2025-05-14 RX ORDER — TRAMADOL HYDROCHLORIDE 50 MG/1
50 TABLET ORAL EVERY 6 HOURS PRN
Qty: 16 TABLET | Refills: 0 | Status: SHIPPED | OUTPATIENT
Start: 2025-05-14 | End: 2025-05-24

## 2025-05-14 RX ORDER — TRAMADOL HYDROCHLORIDE 50 MG/1
50 TABLET ORAL EVERY 6 HOURS PRN
Status: DISCONTINUED | OUTPATIENT
Start: 2025-05-14 | End: 2025-05-14 | Stop reason: HOSPADM

## 2025-05-14 RX ORDER — BUPIVACAINE HYDROCHLORIDE 2.5 MG/ML
INJECTION, SOLUTION EPIDURAL; INFILTRATION; INTRACAUDAL; PERINEURAL AS NEEDED
Status: DISCONTINUED | OUTPATIENT
Start: 2025-05-14 | End: 2025-05-14 | Stop reason: HOSPADM

## 2025-05-14 RX ORDER — SODIUM CHLORIDE, SODIUM LACTATE, POTASSIUM CHLORIDE, CALCIUM CHLORIDE 600; 310; 30; 20 MG/100ML; MG/100ML; MG/100ML; MG/100ML
INJECTION, SOLUTION INTRAVENOUS CONTINUOUS PRN
Status: DISCONTINUED | OUTPATIENT
Start: 2025-05-14 | End: 2025-05-14

## 2025-05-14 RX ORDER — ONDANSETRON 2 MG/ML
INJECTION INTRAMUSCULAR; INTRAVENOUS AS NEEDED
Status: DISCONTINUED | OUTPATIENT
Start: 2025-05-14 | End: 2025-05-14

## 2025-05-14 RX ORDER — ACETAMINOPHEN 325 MG/1
650 TABLET ORAL EVERY 6 HOURS PRN
Status: DISCONTINUED | OUTPATIENT
Start: 2025-05-14 | End: 2025-05-14 | Stop reason: HOSPADM

## 2025-05-14 RX ORDER — DOCUSATE SODIUM 100 MG/1
100 CAPSULE, LIQUID FILLED ORAL 3 TIMES DAILY PRN
Qty: 30 CAPSULE | Refills: 0 | Status: SHIPPED | OUTPATIENT
Start: 2025-05-14

## 2025-05-14 RX ORDER — CHLORHEXIDINE GLUCONATE 40 MG/ML
SOLUTION TOPICAL DAILY PRN
Status: DISCONTINUED | OUTPATIENT
Start: 2025-05-14 | End: 2025-05-14 | Stop reason: HOSPADM

## 2025-05-14 RX ORDER — PROMETHAZINE HYDROCHLORIDE 25 MG/ML
6.25 INJECTION, SOLUTION INTRAMUSCULAR; INTRAVENOUS
Status: DISCONTINUED | OUTPATIENT
Start: 2025-05-14 | End: 2025-05-14 | Stop reason: HOSPADM

## 2025-05-14 RX ADMIN — SUGAMMADEX 200 MG: 100 INJECTION, SOLUTION INTRAVENOUS at 11:41

## 2025-05-14 RX ADMIN — DEXMEDETOMIDINE HYDROCHLORIDE 12 MCG: 100 INJECTION, SOLUTION, CONCENTRATE INTRAVENOUS at 10:43

## 2025-05-14 RX ADMIN — ONDANSETRON 4 MG: 2 INJECTION INTRAMUSCULAR; INTRAVENOUS at 11:28

## 2025-05-14 RX ADMIN — FENTANYL CITRATE 50 MCG: 50 INJECTION, SOLUTION INTRAMUSCULAR; INTRAVENOUS at 10:45

## 2025-05-14 RX ADMIN — ROCURONIUM 50 MG: 50 INJECTION, SOLUTION INTRAVENOUS at 10:46

## 2025-05-14 RX ADMIN — MIDAZOLAM HYDROCHLORIDE 2 MG: 1 INJECTION, SOLUTION INTRAMUSCULAR; INTRAVENOUS at 10:43

## 2025-05-14 RX ADMIN — PROPOFOL 100 MG: 10 INJECTION, EMULSION INTRAVENOUS at 11:34

## 2025-05-14 RX ADMIN — FENTANYL CITRATE 50 MCG: 50 INJECTION, SOLUTION INTRAMUSCULAR; INTRAVENOUS at 11:10

## 2025-05-14 RX ADMIN — SUGAMMADEX 200 MG: 100 INJECTION, SOLUTION INTRAVENOUS at 11:47

## 2025-05-14 RX ADMIN — SODIUM CHLORIDE, SODIUM LACTATE, POTASSIUM CHLORIDE, AND CALCIUM CHLORIDE: .6; .31; .03; .02 INJECTION, SOLUTION INTRAVENOUS at 10:43

## 2025-05-14 RX ADMIN — LIDOCAINE HYDROCHLORIDE 100 MG: 20 INJECTION, SOLUTION EPIDURAL; INFILTRATION; INTRACAUDAL; PERINEURAL at 10:45

## 2025-05-14 RX ADMIN — DEXAMETHASONE SODIUM PHOSPHATE 10 MG: 10 INJECTION INTRAMUSCULAR; INTRAVENOUS at 10:46

## 2025-05-14 RX ADMIN — PROPOFOL 200 MG: 10 INJECTION, EMULSION INTRAVENOUS at 10:45

## 2025-05-14 RX ADMIN — CEFAZOLIN SODIUM 2000 MG: 2 SOLUTION INTRAVENOUS at 10:53

## 2025-05-14 NOTE — ANESTHESIA POSTPROCEDURE EVALUATION
Post-Op Assessment Note    CV Status:  Stable  Pain Score: 1    Pain management: adequate       Mental Status:  Awake   Hydration Status:  Euvolemic   PONV Controlled:  Controlled   Airway Patency:  Patent  Airway: intubated     Post Op Vitals Reviewed: Yes    No anethesia notable event occurred.    Staff: CRNA           Last Filed PACU Vitals:  Vitals Value Taken Time   Temp 97.8 °F (36.6 °C) 05/14/25 11:54   Pulse 88 05/14/25 11:55   /60 05/14/25 11:54   Resp 19 05/14/25 11:55   SpO2 100 % 05/14/25 11:55   Vitals shown include unfiled device data.

## 2025-05-14 NOTE — ANESTHESIA PREPROCEDURE EVALUATION
Procedure:  EXCISION PILONIDAL CYST (Buttocks)    Relevant Problems   ANESTHESIA (within normal limits)      ENDO (within normal limits)      GI/HEPATIC (within normal limits)      /RENAL (within normal limits)      GYN (within normal limits)      HEMATOLOGY (within normal limits)      MUSCULOSKELETAL   (+) Coccydynia      NEURO/PSYCH   (+) Generalized anxiety disorder   (+) PTSD (post-traumatic stress disorder)   (+) Recurrent major depressive disorder, in partial remission (HCC)      Dermatology   (+) Pilonidal cyst        Physical Exam    Airway     Mallampati score: II  TM Distance: >3 FB  Neck ROM: full      Cardiovascular  Cardiovascular exam normal    Dental   No notable dental hx     Pulmonary  Pulmonary exam normal     Neurological  - normal exam    Other Findings  post-pubertal.      Anesthesia Plan  ASA Score- 1     Anesthesia Type- general with ASA Monitors.         Additional Monitors:     Airway Plan: oral.           Plan Factors-Exercise tolerance (METS): >4 METS.    Chart reviewed.  Imaging results reviewed. Existing labs reviewed. Patient summary reviewed.    Patient is not a current smoker.              Induction- intravenous.    Postoperative Plan- Plan for postoperative opioid use.   Monitoring Plan - Monitoring plan - standard ASA monitoring and train of four monitoring  Post Operative Pain Plan - non-opiod analgesics, plan for postoperative opioid use and multimodal analgesia    Perioperative Resuscitation Plan - Level 1 - Full Code.       Informed Consent- Anesthetic plan and risks discussed with patient.  I personally reviewed this patient with the CRNA. Discussed and agreed on the Anesthesia Plan with the CRNA..      NPO Status:  Vitals Value Taken Time   Date of last liquid 05/13/25 05/14/25 09:42   Time of last liquid 2100 05/14/25 09:42   Date of last solid 05/13/25 05/14/25 09:42   Time of last solid 1930 05/14/25 09:42     Discussed General Anesthesia with patient including but not  limited to risk of cardiac insult, pulmonary complication, stroke, reaction to medications and death. All questions answered and consent was obtained.      BB last night < 24 hours ago

## 2025-05-14 NOTE — ANESTHESIA POSTPROCEDURE EVALUATION
Post-Op Assessment Note    CV Status:  Stable    Pain management: adequate       Mental Status:  Alert and awake   Hydration Status:  Euvolemic   PONV Controlled:  Controlled   Airway Patency:  Patent     Post Op Vitals Reviewed: Yes    No anethesia notable event occurred.    Staff: Anesthesiologist           Last Filed PACU Vitals:  Vitals Value Taken Time   Temp 97.8 °F (36.6 °C) 05/14/25 11:54   Pulse 87 05/14/25 12:31   /67 05/14/25 12:30   Resp 11 05/14/25 12:31   SpO2 96 % 05/14/25 12:31   Vitals shown include unfiled device data.    Modified [place]:     Vitals Value Taken Time   Activity 2 05/14/25 12:00   Respiration 2 05/14/25 12:00   Circulation 2 05/14/25 12:00   Consciousness 1 05/14/25 12:00   Oxygen Saturation 2 05/14/25 12:00     Modified [place] Score: 9

## 2025-05-14 NOTE — OP NOTE
OPERATIVE REPORT  PATIENT NAME: Dave Estrada    :  2005  MRN: [id number]  Pt Location: [facility] OR ROOM 01    SURGERY DATE: 2025    Surgeons and Role:     * Sam Melendez DO - Primary     * Ventura Dumont PA-C - Assisting    Preop Diagnosis:  Pilonidal abscess [L05.01]  Pilonidal cyst [L05.91]    Post-Op Diagnosis Codes:     * Pilonidal abscess [L05.01]     * Pilonidal cyst [L05.91]    Procedure(s):  EXCISION PILONIDAL CYST    Specimen(s):  ID Type Source Tests Collected by Time Destination   1 : PILONIDAL CYST Tissue Pilonidal Cyst/Sinus TISSUE EXAM Sam Melendez DO 2025 1130        Estimated Blood Loss:   Minimal    Drains:  * No LDAs found *    Anesthesia Type:   General    Operative Indications:  Pilonidal abscess [L05.01]  Pilonidal cyst [L05.91]    Operative Findings:  Excision pilonidal cyst and sinus  Specimen size 5 x 4 x 2.5 cm, incision closure 5.5 cm  Dissection was carried down through the subcutaneous tissue to the level of the presacral fascia, the presacral fascia was not entered  Specimen was excised with grossly negative margins, no measurable margins were taken    Complications:   None    Procedure and Technique:  The patient was seen again in Preoperative Holding.  All the risks, benefits, complications, treatment options, and expected outcomes were discussed with the patient and family at length. All questions were answered to satisfaction. There was concurrence with the proposed plan and informed consent was obtained. The site of surgery was properly noted/marked. The patient was taken to Operating Room, identified, and the procedure verified as pilonidal cyst excision.    Anesthesia was induced and the patient was intubated. The patient was placed in the prone position on the operating room table.  The patient had received preoperative antibiotics and SCDs placed on the bilateral lower extremities. The patient's buttocks were taped to the side to allow access to  the pilonidal cyst area.    The pilonidal cyst area was then prepped and draped in the usual sterile fashion using Betadine.  A Time-Out was then performed with all involved present confirming the correct patient, procedure, antibiotics, and any additional concerns. Using an Angiocath methylene blue was injected into the sinus tract. An elliptical incision was drawn to incorporate the sinus tract and previous incision and drainage scar. Using a #15 blade the skin was incised on the marked area. Using electrocautery the subcutaneous tissue was dissected making sure to incorporate the entirety of the cyst. The cyst was excised making sure the extent of dissection was down to the sacral fascia. The area was irrigated copiously and hemostasis was noted. The tape holding the buttocks was released. Local anesthesia of 0.25% Marcaine was infiltrated. Interrupted 3-0 Vicryl suture was used to approximate the deep dermal tissue.  Running 4-0 Monocryl was used to close the skin.  The skin closure was reinforced using 2-0 Nylon suture in an interrupted fashion. The area was cleansed and dried and a sterile occlusive dressing was used to cover the site.    All instrument, sponge, and needle counts were noted to be correct at the conclusion of the case.     I was present for the entire procedure., A qualified resident physician was not available., and A physician assistant was required during the procedure for retraction, tissue handling, dissection and suturing.    Patient Disposition:  PACU  and extubated and stable    SIGNATURE: Sam Melendez,   DATE: May 14, 2025  TIME: 11:34 AM

## 2025-05-14 NOTE — DISCHARGE INSTR - AVS FIRST PAGE
Your incisions are covered with stitches, 4 x 4 gauze, Tegaderm.  In 2 days you may remove your dressing, the stitches will remain on your skin but the 4 x 4 gauze and Tegaderm can be removed.  The stitches will be removed in the office.  After your dressings are removed you may shower.  Do not soak your incisions including tub baths or swimming.  You may shower and let water and soap wash over incisions. Do not scrub your incisions.  You may resume your normal diet as tolerated.  Do not make any important decisions and do not drive while taking narcotic pain medication.  You are prescribed Tramadol for severe pain. Take only as needed.  Take Colace to soften your stool while taking narcotic pain medication.  You may take Tylenol over-the-counter as needed for pain, follow instructions on the bottle.  You may take Ibuprofen over-the-counter as needed for pain, follow instructions on the bottle.  You may alternate Tylenol and Ibuprofen if needed, but do not take at the same time.  Follow-up with your Surgeon in 2 weeks, call the office for an appointment.  You will receive a survey via Email in regards to your same day surgery experience. Please fill out the survey to let us know how we did with your care.

## 2025-05-14 NOTE — INTERVAL H&P NOTE
H&P reviewed. After examining the patient I find no changes in the patients condition since the H&P had been written.    Vitals:    05/14/25 0941   BP: 136/90   Pulse: 101   Resp: 20   Temp: 98 °F (36.7 °C)   SpO2: 99%

## 2025-05-20 PROCEDURE — 88304 TISSUE EXAM BY PATHOLOGIST: CPT | Performed by: STUDENT IN AN ORGANIZED HEALTH CARE EDUCATION/TRAINING PROGRAM

## 2025-05-29 ENCOUNTER — OFFICE VISIT (OUTPATIENT)
Dept: SURGERY | Facility: CLINIC | Age: 20
End: 2025-05-29

## 2025-05-29 ENCOUNTER — OFFICE VISIT (OUTPATIENT)
Dept: CARDIOLOGY CLINIC | Facility: CLINIC | Age: 20
End: 2025-05-29
Payer: COMMERCIAL

## 2025-05-29 VITALS
OXYGEN SATURATION: 98 % | HEIGHT: 72 IN | DIASTOLIC BLOOD PRESSURE: 78 MMHG | TEMPERATURE: 97.5 F | SYSTOLIC BLOOD PRESSURE: 122 MMHG | RESPIRATION RATE: 18 BRPM | HEART RATE: 83 BPM | BODY MASS INDEX: 29.07 KG/M2 | WEIGHT: 214.6 LBS

## 2025-05-29 VITALS
RESPIRATION RATE: 16 BRPM | BODY MASS INDEX: 29.12 KG/M2 | HEART RATE: 89 BPM | WEIGHT: 215 LBS | OXYGEN SATURATION: 96 % | DIASTOLIC BLOOD PRESSURE: 82 MMHG | HEIGHT: 72 IN | SYSTOLIC BLOOD PRESSURE: 124 MMHG

## 2025-05-29 DIAGNOSIS — I49.3 PVCS (PREMATURE VENTRICULAR CONTRACTIONS): ICD-10-CM

## 2025-05-29 DIAGNOSIS — L05.01 PILONIDAL ABSCESS: ICD-10-CM

## 2025-05-29 DIAGNOSIS — L05.91 PILONIDAL CYST: Primary | ICD-10-CM

## 2025-05-29 DIAGNOSIS — R07.9 CHEST PAIN, UNSPECIFIED TYPE: ICD-10-CM

## 2025-05-29 DIAGNOSIS — I42.9 CARDIOMYOPATHY, UNSPECIFIED TYPE (HCC): Primary | ICD-10-CM

## 2025-05-29 DIAGNOSIS — F19.11 H/O: SUBSTANCE ABUSE (HCC): ICD-10-CM

## 2025-05-29 PROCEDURE — 99024 POSTOP FOLLOW-UP VISIT: CPT | Performed by: STUDENT IN AN ORGANIZED HEALTH CARE EDUCATION/TRAINING PROGRAM

## 2025-05-29 PROCEDURE — 99214 OFFICE O/P EST MOD 30 MIN: CPT | Performed by: INTERNAL MEDICINE

## 2025-05-29 NOTE — PROGRESS NOTES
"Name: Dave Estrada      : 2005      MRN: [id number]  Encounter Provider: Sam Melendez DO  Encounter Date: 2025   Encounter department: [facility]  :  Assessment & Plan  Pilonidal cyst  20-year-old male status post excision of pilonidal cyst on 2025  -Patient denies any pain or drainage  - Notes the area is slightly itchy  -Superior gluteal cleft incision healing well without erythema induration or drainage, stitches intact  -Stitches removed  - Pathology report reviewed  -Patient is okay to return to work on 2025 with restrictions of no heavy lifting greater than 15 to 20 pounds  - Okay to return to work on 2025 with no restrictions  -Follow-up in office in 4 weeks for wound check    Final Diagnosis   A. Skin and subcutaneous tissue, \"pilonidal cyst\"; excision:       - Consistent with pilonidal sinus/cyst      - Negative for malignancy      I reviewed the pathology report and discussed the findings with the patient and their accompanying family or caregiver, if present. All questions were answered to satisfaction and the patient along with family or caregiver, if present, voiced understanding.         Pilonidal abscess  See plan under pilonidal cyst           History of Present Illness   HPI  Dave Estrada is a 20 y.o. male who presents for evaluation status post excision of pilonidal cyst.  He denies any pain.  He denies any drainage.  He notes the area is itchy.  History obtained from: patient    Review of Systems   Constitutional:  Negative for chills, fatigue and fever.   HENT:  Negative for congestion, hearing loss, rhinorrhea and sore throat.    Eyes:  Negative for pain and discharge.   Respiratory:  Negative for cough, chest tightness and shortness of breath.    Cardiovascular:  Negative for chest pain and palpitations.   Gastrointestinal:  Negative for abdominal pain, constipation, diarrhea, nausea and vomiting.   Endocrine: Negative for cold " intolerance and heat intolerance.   Genitourinary:  Negative for difficulty urinating and dysuria.   Musculoskeletal:  Negative for back pain and neck pain.   Skin:  Negative for color change and rash.   Allergic/Immunologic: Negative for environmental allergies and food allergies.   Neurological:  Negative for seizures and headaches.   Hematological:  Does not bruise/bleed easily.   Psychiatric/Behavioral:  Negative for confusion and hallucinations.      Medical History Reviewed by provider this encounter:  Tobacco  Allergies  Meds  Problems  Med Hx  Surg Hx  Fam Hx     .  Past Medical History   Past Medical History[1]  Past Surgical History[2]  Family History[3]   reports that he has quit smoking. His smoking use included cigarettes, pipe, and cigars. He has a 4 pack-year smoking history. He has been exposed to tobacco smoke. He has quit using smokeless tobacco.  His smokeless tobacco use included snuff. He reports that he does not currently use alcohol after a past usage of about 2.0 standard drinks of alcohol per week. He reports that he does not currently use drugs after having used the following drugs: Marijuana.  Current Outpatient Medications   Medication Instructions    ARIPiprazole (ABILIFY) 5 mg, Oral, Daily at bedtime    busPIRone (BUSPAR) 30 mg, Oral, 2 times daily    escitalopram (LEXAPRO) 20 mg, Oral, Daily    hydrOXYzine HCL (ATARAX) 25 mg, Oral, Daily PRN    lisinopril (ZESTRIL) 2.5 mg, Oral, Every evening    metoprolol succinate (TOPROL-XL) 25 mg 24 hr tablet TAKE 1 TABLET (25 MG TOTAL) BY MOUTH DAILY.   Allergies[4]   Medications Ordered Prior to Encounter[5]   Social History[6]     Objective   /78 (BP Location: Left arm, Patient Position: Sitting, Cuff Size: Standard)   Pulse 83   Temp 97.5 °F (36.4 °C)   Resp 18   Ht 6' (1.829 m)   Wt 97.3 kg (214 lb 9.6 oz)   SpO2 98%   BMI 29.10 kg/m²      Physical Exam  Constitutional:       Appearance: Normal appearance.   HENT:       Head: Normocephalic and atraumatic.      Nose: Nose normal.     Eyes:      General: No scleral icterus.     Conjunctiva/sclera: Conjunctivae normal.       Cardiovascular:      Rate and Rhythm: Normal rate.   Pulmonary:      Effort: Pulmonary effort is normal.     Musculoskeletal:         General: No signs of injury.     Skin:     General: Skin is warm.      Coloration: Skin is not jaundiced.      Comments: Superior gluteal cleft incision healing well without erythema induration or drainage, stitches intact     Neurological:      General: No focal deficit present.      Mental Status: He is alert and oriented to person, place, and time.     Psychiatric:         Mood and Affect: Mood normal.         Behavior: Behavior normal.                [1]   Past Medical History:  Diagnosis Date    Allergic     Anxiety     Depression     Hypertension    [2]   Past Surgical History:  Procedure Laterality Date    CARDIAC CATHETERIZATION Left 10/11/2022    Procedure: Cardiac Left Heart Cath;  Surgeon: Kj Castellanos MD;  Location: [facility];  Service: Cardiology    CARDIAC CATHETERIZATION N/A 10/11/2022    Procedure: Cardiac Coronary Angiogram;  Surgeon: Kj Castellanos MD;  Location: [facility];  Service: Cardiology    GA EXCISION PILONIDAL CYST/SINUS SIMPLE N/A 5/14/2025    Procedure: EXCISION PILONIDAL CYST;  Surgeon: Sam Melendez DO;  Location: [facility];  Service: General    WISDOM TOOTH EXTRACTION     [3] No family history on file.  [4]   Allergies  Allergen Reactions    Bactrim [Sulfamethoxazole-Trimethoprim] Rash   [5]   Current Outpatient Medications on File Prior to Visit   Medication Sig Dispense Refill    ARIPiprazole (ABILIFY) 5 mg tablet Take 5 mg by mouth daily at bedtime      busPIRone (BUSPAR) 30 MG tablet Take 30 mg by mouth in the morning and 30 mg in the evening.      escitalopram (LEXAPRO) 20 mg tablet Take 20 mg by mouth in the morning.      hydrOXYzine HCL (ATARAX) 25 mg tablet  Take 25 mg by mouth daily as needed      lisinopril (ZESTRIL) 2.5 mg tablet Take 1 tablet (2.5 mg total) by mouth every evening 90 tablet 1    metoprolol succinate (TOPROL-XL) 25 mg 24 hr tablet TAKE 1 TABLET (25 MG TOTAL) BY MOUTH DAILY. 90 tablet 1    [DISCONTINUED] docusate sodium (COLACE) 100 mg capsule Take 1 capsule (100 mg total) by mouth 3 (three) times a day as needed for constipation (while taking narcotic pain medication) (Patient not taking: Reported on 5/29/2025) 30 capsule 0     No current facility-administered medications on file prior to visit.   [6]   Social History  Tobacco Use    Smoking status: Former     Current packs/day: 1.00     Average packs/day: 1 pack/day for 4.0 years (4.0 ttl pk-yrs)     Types: Cigarettes, Pipe, Cigars     Passive exposure: Past    Smokeless tobacco: Former     Types: Snuff   Vaping Use    Vaping status: Former    Substances: Nicotine   Substance and Sexual Activity    Alcohol use: Not Currently     Alcohol/week: 2.0 standard drinks of alcohol     Types: 2 Standard drinks or equivalent per week     Comment: rarely    Drug use: Not Currently     Types: Marijuana    Sexual activity: Not Currently     Partners: Female     Birth control/protection: Condom Male

## 2025-05-29 NOTE — LETTER
May 29, 2025     Patient: Dave Estrada  YOB: 2005  Date of Visit: 5/29/2025      To Whom it May Concern:    Dave Estrada is under my professional care. Dave was seen in my office on 5/29/2025. Dave may return to work on 05/30/2025 with restrictions of no heavy lifting/pushing/pulling no more than 15-20 lbs until 6/11/2025. He may resume work with no restrictions after 6/11/2025.    If you have any questions or concerns, please don't hesitate to call.         Sincerely,          Sam Melendez, DO        CC: No Recipients

## 2025-05-29 NOTE — PATIENT INSTRUCTIONS
Continue current medications  Call office if with recurrent palpitations  Gradually increase physical activities/exercise as tolerated

## 2025-05-29 NOTE — PROGRESS NOTES
Advanced Heart Failure Outpatient Progress Note - Dave Estrada 20 y.o. male MRN: [id number]    Encounter: [id number]      Assessment/Plan:    Patient Active Problem List    Diagnosis Date Noted    Pilonidal abscess 05/01/2025    H/O: substance abuse (HCC) 02/06/2025    Pilonidal cyst 11/18/2024    Furuncle 03/04/2024    Low left ventricular ejection fraction 09/13/2022    Recurrent major depressive disorder, in partial remission (HCC) 08/03/2022    Generalized anxiety disorder 08/03/2022    PTSD (post-traumatic stress disorder) 08/03/2022    Overweight 08/03/2022    SOB (shortness of breath) on exertion 08/03/2022    Coccydynia 08/03/2022     Cardiomyopathy, Stage B/C, NYHA II  Etiology: Unclear. Nonischemic. Possible toxin mediated - history of drug abuse although unknown substances used. Normal coronaries. Cardiac MRI negative for inflammation, infiltrative disease or scarring.  Reports mild COVID infection around the time of diagnosis. COVID vaccination June and July 2021. 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  Normal wall thickness  Normal RV size and systolic function  No MR seen  No TR seen  IVC normal  No pericardial effusion    Echo 8/18/23: LVEF 50-55%. LVIDD 5.4cm. Normal wall thickness. No regional all motion abnormalities    Cardiac MRI 10/12/22:  LVEF 45%. CO 6.4 LPM  LVIDD 6cm, normal wall thickness, no RWMA  Normal RV size and systolic function, no RWMA  Normal LA size  No evidence of fibrofatty infiltration of RV myocardium  Delayed post-gadolinium imaging demonstrates no region of hyperenhancement.     Zio 10/14/22:  Sinus rhythm  One run  of NSVT up to 5 beats at 197 bpm  Frequent PVCs, 7.8% burden  Rare PACs  Symptoms reported including lightheadedness, dizziness, chest pain/pressure, pain and tingling in the neck or arm, palpitations, shortness of breath, fluttering/racing during normal sinus rhythm, PVCs and ventricular bigeminy.     LHC 10/11/22: Normal epicardial coronary arteries     Echocardiogram 9/13/22  LVEF: 40-45%  LVIDd: 5.3cm, normal wall thickness  RV: normal size and systolic function  MR: none  PASP: unable to estimate, no TR  RVOT: normal, parabolic  Other: no pericardial effusion, normal diastology     Neurohormonal Blockade:  --Beta-Blocker: metoprolol succinate 25mg BID  --ACEi, ARB or ARNi: lisinopril 2.5mg daily  --Aldosterone Receptor Blocker:  --SGLT2 Inhibitor:  --Diuretic:     Sudden Cardiac Death Risk Reduction:  --ICD: LVEF >35%     Electrical Resynchronization:  --Candidacy for BiV device: narrow QRS     Advanced Therapies (if appropriate):  --We will continue to monitor, not indicated at this time     H/o chest pain  Normal coronaries as above  Seen at the ED earlier this month for heart racing and chest tightness, troponins negative  Anxiety   Depression  PVCs, improved with initiation of betablocker, seen by Dr. Ivey  Frequent PVCs on last visit, metoprolol dose increased to 25mg BID  H/o substance abuse, unknown agents, remains in remission  COVID 9/2022, mild symptoms, primarily migraines per patient   COVID vaccine, 6/30 and 7/2021     Today's Plan:  Continue current medications with increased dose of metoprolol as above  Call office if with recurrent palpitations  Gradually increase physical activities/exercise as tolerated        HPI:   Initial consult 5/18/23: 17 y/o male with past med history as above initially seen in consultation by Dr. Castillo on 8 September 2022.  Patient has been having occasional palpitations.also with shortness of breath and lightheadedness walking up a flight of stairs.  Also with  chest pressure tightness.  Primary care doctor ordered an echocardiogram which showed EF of 40 to 45% with mild global hypokinesis and patient was sent for cardiology evaluation.  He underwent cardiac catheterization which showed normal coronary arteries.  EKG showed PVCs.  He underwent extended ambulatory Holter monitor which showed PVC burden of 7.8%.  He was in sinus rhythm throughout the period of monitoring.  Symptoms correlated with sinus rhythm, PVCs and ventricular bigeminy.  He underwent cardiac MRI which showed LVEF of 45%, mildly dilated LV at 6 cm, normal RV size and systolic function, no evidence of inflammation, infiltrative disease or scaring.  Seen by Dr. Ivey for evaluation of frequent PVCs.  This is deemed to be overall improved after initiation of beta-blocker with plan for repeat Holter monitoring should he have more episodes.  He was scheduled to see me in December 2022 for heart failure consultation but patient canceled appointment, now he is here to establish care.  He is accompanied by his father at office visit today.  Patient with reported history of substance abuse prior to onset of cardiomyopathy.  Unknown substances used.  Patient currently denying palpitations or chest pain but still reporting poor activity tolerance.  He is now currently working a car wash.  Father notes the less activity intolerance and him.  The patient does not engage in regular physical activities or exercise and gets easily exhausted.  Patient is adopted, family history unknown.    10/12/23: Mr. Estrada presents today accompanied by his dad. They consented to the usage of MARISSA.   He has been doing well and denies any concerns today.   He denies trouble breathing, swelling, or bloating.   Other than his normal tiredness, he denies fatigue.   The patient can work a full day without any issues.   Outside of work, he does not do much physical activity other than shooting his bow and arrow for 5 to 10 minutes.  "  He will go outside and ride his minibike   He does not have interest in walking their new puppy.   The patient does not do more physical activity because he \"just does not want to.\"  He denies any issues with his medications.     2/6/2025: Patient is here for follow-up.  Last seen by me as above.  Reports noting shortness of breath with more exertion over the past few months.  Notices symptoms with lifting and walking up a flight of stairs.  He is still fairly inactive with no regular physical/exercise routine.  Denies palpitations, dizziness or lightheadedness.  Reports occasional chest tightness.  Reports adherence to medications as prescribed.  EKG today showed sinus rhythm with frequent PVCs and incomplete right bundle branch block.    5/29/25: Here for follow up. Echo 2/2025 showed EF 50% and normal RV size and systolic function.  He had one of heart racing and chest tightness and was seen at the ED 5/9/25. Troponins negative. He was having a lot of anxiety at that time. No recurrence since then. He had stopped taking the metoprolol and lisinopril but has resumed taking prior to ED visit. Advised importance of taking medications. He continues to be sedentary and advised to gradually increase physical activities.     Review of Systems   Constitutional:  Negative for chills and fever.   HENT:  Negative for ear pain and sore throat.    Eyes:  Negative for pain and visual disturbance.   Respiratory:  Negative for cough, chest tightness and shortness of breath.    Cardiovascular:  Negative for chest pain, palpitations and leg swelling.   Gastrointestinal:  Negative for abdominal pain and vomiting.   Genitourinary:  Negative for dysuria and hematuria.   Musculoskeletal:  Negative for arthralgias and back pain.   Skin:  Negative for color change and rash.   Neurological:  Negative for dizziness, seizures, syncope and light-headedness.   All other systems reviewed and are negative.     Past Medical History: "   Diagnosis Date    Allergic     Anxiety     Depression     Hypertension        Allergies   Allergen Reactions    Bactrim [Sulfamethoxazole-Trimethoprim] Rash     .    Current Outpatient Medications:     ARIPiprazole (ABILIFY) 5 mg tablet, Take 5 mg by mouth daily at bedtime, Disp: , Rfl:     busPIRone (BUSPAR) 30 MG tablet, Take 30 mg by mouth in the morning and 30 mg in the evening., Disp: , Rfl:     escitalopram (LEXAPRO) 20 mg tablet, Take 20 mg by mouth in the morning., Disp: , Rfl:     hydrOXYzine HCL (ATARAX) 25 mg tablet, Take 25 mg by mouth daily as needed, Disp: , Rfl:     lisinopril (ZESTRIL) 2.5 mg tablet, Take 1 tablet (2.5 mg total) by mouth every evening, Disp: 90 tablet, Rfl: 1    metoprolol succinate (TOPROL-XL) 25 mg 24 hr tablet, TAKE 1 TABLET (25 MG TOTAL) BY MOUTH DAILY., Disp: 90 tablet, Rfl: 1    Social History     Socioeconomic History    Marital status: Single     Spouse name: Not on file    Number of children: Not on file    Years of education: Not on file    Highest education level: Not on file   Occupational History    Not on file   Tobacco Use    Smoking status: Former     Current packs/day: 1.00     Average packs/day: 1 pack/day for 4.0 years (4.0 ttl pk-yrs)     Types: Cigarettes, Pipe, Cigars     Passive exposure: Past    Smokeless tobacco: Former     Types: Snuff   Vaping Use    Vaping status: Former    Substances: Nicotine   Substance and Sexual Activity    Alcohol use: Not Currently     Alcohol/week: 2.0 standard drinks of alcohol     Types: 2 Standard drinks or equivalent per week     Comment: rarely    Drug use: Not Currently     Types: Marijuana    Sexual activity: Not Currently     Partners: Female     Birth control/protection: Condom Male   Other Topics Concern    Not on file   Social History Narrative    Not on file     Social Drivers of Health     Financial Resource Strain: Not on file   Food Insecurity: Not on file   Transportation Needs: Not on file   Physical Activity:  Not on file   Stress: Not on file   Social Connections: Not on file   Intimate Partner Violence: Not on file   Housing Stability: Not on file       No family history on file.    Physical Exam:    Vitals:   Vitals:    05/29/25 1424   BP: 124/82   Pulse: 89   Resp: 16   SpO2: 96%         Physical Exam  Constitutional:       General: He is not in acute distress.     Appearance: Normal appearance.   HENT:      Head: Normocephalic and atraumatic.      Mouth/Throat:      Mouth: Mucous membranes are moist.     Eyes:      General: No scleral icterus.     Extraocular Movements: Extraocular movements intact.     Neck:      Vascular: No JVD.     Cardiovascular:      Rate and Rhythm: Normal rate and regular rhythm.      Pulses: Normal pulses.      Heart sounds: S1 normal and S2 normal. No murmur heard.     No friction rub. No gallop.   Pulmonary:      Breath sounds: Normal breath sounds.   Abdominal:      General: There is no distension.      Palpations: Abdomen is soft.      Tenderness: There is no abdominal tenderness. There is no guarding or rebound.     Musculoskeletal:         General: Normal range of motion.      Cervical back: Neck supple.      Right lower leg: No edema.      Left lower leg: No edema.     Skin:     General: Skin is warm and dry.      Capillary Refill: Capillary refill takes less than 2 seconds.     Neurological:      General: No focal deficit present.      Mental Status: He is alert and oriented to person, place, and time.     Psychiatric:         Mood and Affect: Mood normal.         Labs & Results:    Lab Results   Component Value Date    SODIUM 138 05/09/2025    K 3.8 05/09/2025     05/09/2025    CO2 29 05/09/2025    BUN 13 05/09/2025    CREATININE 1.15 05/09/2025    GLUC 79 05/09/2025    CALCIUM 10.3 (H) 05/09/2025     Lab Results   Component Value Date    WBC 10.52 (H) 05/09/2025    HGB 16.8 05/09/2025    HCT 49.7 (H) 05/09/2025    MCV 84 05/09/2025     05/09/2025     No results found  "for: \"NTBNP\"   Lab Results   Component Value Date    CHOLESTEROL 119 05/05/2025    CHOLESTEROL 133 08/17/2022     Lab Results   Component Value Date    HDL 39 (L) 05/05/2025    HDL 42 08/17/2022     Lab Results   Component Value Date    TRIG 178 (H) 05/05/2025    TRIG 166 (H) 08/17/2022     Lab Results   Component Value Date    NONHDLC 80 05/05/2025    NONHDLC 91 08/17/2022       EKG personally reviewed by Sharon Saba MD .     Counseling / Coordination of Care  Time spent today 25 minutes.  Greater than 50% of total time was spent with the patient and / or family counseling and / or coordination of care. We went over current diagnosis, most recent studies and any changes in treatment.    Thank you for the opportunity to participate in the care of this patient.    SHARON SABA MD  ADVANCED HEART FAILURE AND MECHANICAL CIRCULATORY SUPPORT  [facility]      "

## 2025-05-29 NOTE — ASSESSMENT & PLAN NOTE
"20-year-old male status post excision of pilonidal cyst on 5/14/2025  -Patient denies any pain or drainage  - Notes the area is slightly itchy  -Superior gluteal cleft incision healing well without erythema induration or drainage, stitches intact  -Stitches removed  - Pathology report reviewed  -Patient is okay to return to work on 5/30/2025 with restrictions of no heavy lifting greater than 15 to 20 pounds  - Okay to return to work on 6/11/2025 with no restrictions  -Follow-up in office in 4 weeks for wound check    Final Diagnosis   A. Skin and subcutaneous tissue, \"pilonidal cyst\"; excision:       - Consistent with pilonidal sinus/cyst      - Negative for malignancy      I reviewed the pathology report and discussed the findings with the patient and their accompanying family or caregiver, if present. All questions were answered to satisfaction and the patient along with family or caregiver, if present, voiced understanding.         "

## 2025-05-30 RX ORDER — METOPROLOL SUCCINATE 25 MG/1
25 TABLET, EXTENDED RELEASE ORAL 2 TIMES DAILY
Qty: 180 TABLET | Refills: 2 | Status: SHIPPED | OUTPATIENT
Start: 2025-05-30

## 2025-06-09 ENCOUNTER — TELEPHONE (OUTPATIENT)
Dept: OTHER | Facility: HOSPITAL | Age: 20
End: 2025-06-09

## 2025-06-09 ENCOUNTER — NURSE TRIAGE (OUTPATIENT)
Dept: OTHER | Facility: OTHER | Age: 20
End: 2025-06-09

## 2025-06-09 NOTE — TELEPHONE ENCOUNTER
"Regarding: post surgery / pus from cyst  ----- Message from Kerrie VARGAS sent at 6/9/2025  6:09 PM EDT -----  \" I have a concern after surgery. I had a Pilonidal cyst surgery couple weeks ago (5/14) - recently pus coming out and before that was blood. I did have the stitches out and he said it looked fine but now I have this\"    "

## 2025-06-09 NOTE — TELEPHONE ENCOUNTER
"REASON FOR CONVERSATION: Post-op Problem    SYMPTOMS: Bleeding from incision that started about 5 days ago and puss like drainage that started 2 days ago. Drainage is a cloudy yellow brown color. Denies fever. No pain or other symptoms.     OTHER HEALTH INFORMATION: Pt had EXCISION PILONIDAL CYST (Buttocks) on 5/14/25.    PROTOCOL DISPOSITION: Call PCP Now    CARE ADVICE PROVIDED: Per Dr. Song- will call pt directly.    PRACTICE FOLLOW-UP: Not at this time.       Reason for Disposition   [1] Caller has URGENT question AND [2] triager unable to answer question    Answer Assessment - Initial Assessment Questions  1. SYMPTOM: \"What's the main symptom you're concerned about?\" (e.g., drainage, incision opened up, pain, redness)      Noticing puss and drainage over incision where pilonidal cyst was surgically removed.     2. ONSET: \"When did   start?\"      Noticed blood about 4-5 days again and then puss 2 days ago.     3. SURGERY: \"What surgery did you have?\"      EXCISION PILONIDAL CYST (Buttocks)    4. DATE of SURGERY: \"When was the surgery?\"       5/14    5. INCISION SITE: \"Where is the incision located?\"       Yes    6. REDNESS: \"Is there any redness at the incision site?\" If Yes, ask: \"How wide across is the redness?\" (Inches, centimeters)       No redness. Looks bruised- black and blue.     7. PAIN: \"Is there any pain?\" If Yes, ask: \"How bad is it?\"  (Scale 1-10; or mild, moderate, severe)      No pain.     8. BLEEDING: \"Is there any bleeding?\" If Yes, ask: \"How much?\" and \"Where?\"      No bleeding today.     9. DRAINAGE: \"Is there any drainage from the incision site?\" If Yes, ask: \"What color and how much?\" (e.g., red, cloudy, pus; drops, teaspoon)      Cloudy white drainage.     10. FEVER: \"Do you have a fever?\" If Yes, ask: \"What is your temperature, how was it measured, and when did it start?\"        Denies fever.     11. OTHER SYMPTOMS: \"Do you have any other symptoms?\" (e.g., dizziness, rash elsewhere on " body, shaking chills, weakness)        No other symptoms.    Protocols used: Post-Op Incision Symptoms and Questions-Adult-

## 2025-06-16 ENCOUNTER — OFFICE VISIT (OUTPATIENT)
Age: 20
End: 2025-06-16
Payer: COMMERCIAL

## 2025-06-16 VITALS
HEIGHT: 72 IN | DIASTOLIC BLOOD PRESSURE: 70 MMHG | TEMPERATURE: 97.7 F | RESPIRATION RATE: 16 BRPM | SYSTOLIC BLOOD PRESSURE: 120 MMHG | WEIGHT: 210.4 LBS | HEART RATE: 83 BPM | BODY MASS INDEX: 28.5 KG/M2 | OXYGEN SATURATION: 98 %

## 2025-06-16 DIAGNOSIS — F41.1 GENERALIZED ANXIETY DISORDER: ICD-10-CM

## 2025-06-16 DIAGNOSIS — Z00.00 ANNUAL PHYSICAL EXAM: Primary | ICD-10-CM

## 2025-06-16 DIAGNOSIS — S61.211A LACERATION OF LEFT INDEX FINGER WITHOUT FOREIGN BODY WITHOUT DAMAGE TO NAIL, INITIAL ENCOUNTER: ICD-10-CM

## 2025-06-16 DIAGNOSIS — I42.9 CARDIOMYOPATHY, UNSPECIFIED TYPE (HCC): ICD-10-CM

## 2025-06-16 DIAGNOSIS — F33.41 RECURRENT MAJOR DEPRESSIVE DISORDER, IN PARTIAL REMISSION (HCC): ICD-10-CM

## 2025-06-16 DIAGNOSIS — Z23 ENCOUNTER FOR IMMUNIZATION: ICD-10-CM

## 2025-06-16 PROCEDURE — 99395 PREV VISIT EST AGE 18-39: CPT | Performed by: STUDENT IN AN ORGANIZED HEALTH CARE EDUCATION/TRAINING PROGRAM

## 2025-06-16 PROCEDURE — 90715 TDAP VACCINE 7 YRS/> IM: CPT

## 2025-06-16 PROCEDURE — 90471 IMMUNIZATION ADMIN: CPT

## 2025-06-16 NOTE — PROGRESS NOTES
Adult Annual Physical  Name: Dave Estrada      : 2005      MRN: [id number]  Encounter Provider: Deny Vallejo MD  Encounter Date: 2025   Encounter department: [facility] [place]    :  Assessment & Plan  Annual physical exam  Immunizations: Tdap given; counseled on PCV20 vaccine  Labs: Labs previously done, A1c pending  Screening: Not yet due for cancer screening        Cardiomyopathy, unspecified type (HCC)  Unclear etiology. Echo in February shows improved EF to 50%. Follows with Cardiology. Continue current doses of lisinopril and toprol XL.       Laceration of left index finger without foreign body without damage to nail, initial encounter  After cutting himself with bread knife yesterday. 5 mm laceration on lateral 1st finger of left hand, around level of DIP. Does not appear acutely infected. No current bleeding. Do not think stitches are necessary. Applied antibiotic ointment and bandage to the cut. Advised to change bandage at least once a day. Present to urgent care should it start bleeding. RTC if concern for pain or infection. Patient due for Tdap vaccine - given today.       Generalized anxiety disorder  Stable. Continue lexapro 20 mg qd, buspar 30 mg BID, Atarax 25 mg qd. Advised follow up with Psychiatrist.       Recurrent major depressive disorder, in partial remission (HCC)  Stable. Continue lexapro 20 mg qd, abilify 5 mg qhs. Advised follow up with Psychiatrist.         Encounter for immunization    Orders:    TDAP VACCINE GREATER THAN OR EQUAL TO 8YO IM            Preventive Screenings:  - Diabetes Screening: screening up-to-date  - Cholesterol Screening: screening up-to-date   - Hepatitis C screening: screening up-to-date   - HIV screening: screening up-to-date   - Colon cancer screening: screening not indicated   - Lung cancer screening: screening not indicated   - Prostate cancer screening: screening not indicated     Immunizations:  - Immunizations due:  Prevnar 20, Tdap and HPV (Gardasil 9)  - Risks/benefits immunizations discussed    - Immunizations given per orders      Counseling/Anticipatory Guidance:  - Alcohol: discussed moderation in alcohol intake and recommendations for healthy alcohol use.   - Dental health: discussed importance of regular tooth brushing, flossing, and dental visits.   - Diet: discussed recommendations for a healthy/well-balanced diet.          History of Present Illness     Adult Annual Physical:  Patient presents for annual physical. Dave Estrada is a 19 yo M with PMH of cardiomyopathy, CLEOPATRA, and MDD who presents today for annual physical. He accidentally cut his left 1st finger with a bread knife last night. It did bleed a lot but he put guaze on it to stop the bleeding. The guaze seems to be stuck in the cut. He is not having any pain but he is nervous to take the guaze out..     Diet and Physical Activity:  - Diet/Nutrition: no special diet.  - Exercise: no formal exercise.    General Health:  - Sleep: sleeps well.  - Hearing: decreased hearing bilateral ears.  - Vision: wears glasses and most recent eye exam > 1 year ago.  - Dental: no dental visits for > 1 year and brushes teeth twice daily.     Health:  - History of STDs: no.   - Urinary symptoms: none.     Advanced Care Planning:  - Has an advanced directive?: no    - Has a durable medical POA?: no    - ACP document given to patient?: no      Review of Systems   Constitutional:  Negative for appetite change, chills and fever.   HENT:  Negative for congestion and sore throat.    Eyes:  Negative for visual disturbance.   Respiratory:  Negative for cough and shortness of breath.    Cardiovascular:  Negative for chest pain and leg swelling.   Gastrointestinal:  Negative for abdominal pain, constipation, diarrhea and nausea.   Genitourinary:  Negative for difficulty urinating and dysuria.   Musculoskeletal:  Negative for arthralgias and myalgias.   Skin:  Negative for rash.         Finger laceration   Neurological:  Negative for dizziness, light-headedness and headaches.   Psychiatric/Behavioral:  Negative for confusion.      Medical History Reviewed by provider this encounter:  Meds  Problems     .  Past Medical History   Past Medical History[1]  Past Surgical History[2]  Family History[3]   reports that he has quit smoking. His smoking use included cigarettes, pipe, and cigars. He has a 4 pack-year smoking history. He has been exposed to tobacco smoke. He has quit using smokeless tobacco.  His smokeless tobacco use included snuff. He reports that he does not currently use alcohol after a past usage of about 2.0 standard drinks of alcohol per week. He reports that he does not currently use drugs after having used the following drugs: Marijuana.  Current Outpatient Medications   Medication Instructions    ARIPiprazole (ABILIFY) 5 mg, Daily at bedtime    busPIRone (BUSPAR) 30 mg, 2 times daily    escitalopram (LEXAPRO) 20 mg, Daily    hydrOXYzine HCL (ATARAX) 25 mg, Daily PRN    lisinopril (ZESTRIL) 2.5 mg, Oral, Every evening    metoprolol succinate (TOPROL-XL) 25 mg, Oral, 2 times daily   Allergies[4]   Medications Ordered Prior to Encounter[5]   Social History[6]    Objective   /70 (BP Location: Left arm, Patient Position: Sitting, Cuff Size: Standard)   Pulse 83   Temp 97.7 °F (36.5 °C) (Tympanic)   Resp 16   Ht 6' (1.829 m)   Wt 95.4 kg (210 lb 6.4 oz)   SpO2 98%   BMI 28.54 kg/m²     Physical Exam  Constitutional:       General: He is not in acute distress.  HENT:      Right Ear: Tympanic membrane, ear canal and external ear normal.      Left Ear: Tympanic membrane, ear canal and external ear normal.      Nose: Nose normal.      Mouth/Throat:      Mouth: Mucous membranes are moist.      Pharynx: Oropharynx is clear.     Eyes:      Conjunctiva/sclera: Conjunctivae normal.      Pupils: Pupils are equal, round, and reactive to light.     Neck:      Thyroid: No thyroid mass or  thyroid tenderness.     Cardiovascular:      Rate and Rhythm: Normal rate and regular rhythm.      Heart sounds: Normal heart sounds.   Pulmonary:      Effort: Pulmonary effort is normal.      Breath sounds: Normal breath sounds.   Abdominal:      General: There is no distension.      Tenderness: There is no abdominal tenderness.     Musculoskeletal:      Cervical back: Neck supple.      Right lower leg: No edema.      Left lower leg: No edema.     Skin:     General: Skin is warm and dry.      Comments: 5 mm laceration on lateral left 1st finger at level of DIP; no bleeding or surrounding erythema     Neurological:      Mental Status: He is alert.      Sensory: Sensation is intact.      Motor: Motor function is intact.     Psychiatric:         Mood and Affect: Mood normal.         Speech: Speech normal.         Behavior: Behavior normal. Behavior is cooperative.              [1]   Past Medical History:  Diagnosis Date    Allergic     Anxiety     Depression     Hypertension    [2]   Past Surgical History:  Procedure Laterality Date    CARDIAC CATHETERIZATION Left 10/11/2022    Procedure: Cardiac Left Heart Cath;  Surgeon: Kj Castellanos MD;  Location: [facility];  Service: Cardiology    CARDIAC CATHETERIZATION N/A 10/11/2022    Procedure: Cardiac Coronary Angiogram;  Surgeon: Kj Castellanos MD;  Location: [facility];  Service: Cardiology    AR EXCISION PILONIDAL CYST/SINUS SIMPLE N/A 5/14/2025    Procedure: EXCISION PILONIDAL CYST;  Surgeon: Sam Melendez DO;  Location: [facility];  Service: General    WISDOM TOOTH EXTRACTION     [3] No family history on file.  [4]   Allergies  Allergen Reactions    Bactrim [Sulfamethoxazole-Trimethoprim] Rash   [5]   Current Outpatient Medications on File Prior to Visit   Medication Sig Dispense Refill    ARIPiprazole (ABILIFY) 5 mg tablet Take 5 mg by mouth daily at bedtime      busPIRone (BUSPAR) 30 MG tablet Take 30 mg by mouth in the morning and 30  mg in the evening.      escitalopram (LEXAPRO) 20 mg tablet Take 20 mg by mouth in the morning.      hydrOXYzine HCL (ATARAX) 25 mg tablet Take 25 mg by mouth daily as needed      lisinopril (ZESTRIL) 2.5 mg tablet Take 1 tablet (2.5 mg total) by mouth every evening 90 tablet 1    metoprolol succinate (TOPROL-XL) 25 mg 24 hr tablet Take 1 tablet (25 mg total) by mouth 2 (two) times a day 180 tablet 2     No current facility-administered medications on file prior to visit.   [6]   Social History  Tobacco Use    Smoking status: Former     Current packs/day: 1.00     Average packs/day: 1 pack/day for 4.0 years (4.0 ttl pk-yrs)     Types: Cigarettes, Pipe, Cigars     Passive exposure: Past    Smokeless tobacco: Former     Types: Snuff   Vaping Use    Vaping status: Former    Substances: Nicotine   Substance and Sexual Activity    Alcohol use: Not Currently     Alcohol/week: 2.0 standard drinks of alcohol     Types: 2 Standard drinks or equivalent per week     Comment: rarely    Drug use: Not Currently     Types: Marijuana    Sexual activity: Not Currently     Partners: Female     Birth control/protection: Condom Male

## 2025-06-16 NOTE — ASSESSMENT & PLAN NOTE
Stable. Continue lexapro 20 mg qd, buspar 30 mg BID, Atarax 25 mg qd. Advised follow up with Psychiatrist.

## 2025-06-16 NOTE — PATIENT INSTRUCTIONS
"Patient Education     Routine physical for adults   The Basics   Written by the doctors and editors at [facility]   What is a physical? -- A physical is a routine visit, or \"check-up,\" with your doctor. You might also hear it called a \"wellness visit\" or \"preventive visit.\"  During each visit, the doctor will:   Ask about your physical and mental health   Ask about your habits, behaviors, and lifestyle   Do an exam   Give you vaccines if needed   Talk to you about any medicines you take   Give advice about your health   Answer your questions  Getting regular check-ups is an important part of taking care of your health. It can help your doctor find and treat any problems you have. But it's also important for preventing health problems.  A routine physical is different from a \"sick visit.\" A sick visit is when you see a doctor because of a health concern or problem. Since physicals are scheduled ahead of time, you can think about what you want to ask the doctor.  How often should I get a physical? -- It depends on your age and health. In general, for people age 21 years and older:   If you are younger than 50 years, you might be able to get a physical every 3 years.   If you are 50 years or older, your doctor might recommend a physical every year.  If you have an ongoing health condition, like diabetes or high blood pressure, your doctor will probably want to see you more often.  What happens during a physical? -- In general, each visit will include:   Physical exam - The doctor or nurse will check your height, weight, heart rate, and blood pressure. They will also look at your eyes and ears. They will ask about how you are feeling and whether you have any symptoms that bother you.   Medicines - It's a good idea to bring a list of all the medicines you take to each doctor visit. Your doctor will talk to you about your medicines and answer any questions. Tell them if you are having any side effects that bother you. You " "should also tell them if you are having trouble paying for any of your medicines.   Habits and behaviors - This includes:   Your diet   Your exercise habits   Whether you smoke, drink alcohol, or use drugs   Whether you are sexually active   Whether you feel safe at home  Your doctor will talk to you about things you can do to improve your health and lower your risk of health problems. They will also offer help and support. For example, if you want to quit smoking, they can give you advice and might prescribe medicines. If you want to improve your diet or get more physical activity, they can help you with this, too.   Lab tests, if needed - The tests you get will depend on your age and situation. For example, your doctor might want to check your:   Cholesterol   Blood sugar   Iron level   Vaccines - The recommended vaccines will depend on your age, health, and what vaccines you already had. Vaccines are very important because they can prevent certain serious or deadly infections.   Discussion of screening - \"Screening\" means checking for diseases or other health problems before they cause symptoms. Your doctor can recommend screening based on your age, risk, and preferences. This might include tests to check for:   Cancer, such as breast, prostate, cervical, ovarian, colorectal, prostate, lung, or skin cancer   Sexually transmitted infections, such as chlamydia and gonorrhea   Mental health conditions like depression and anxiety  Your doctor will talk to you about the different types of screening tests. They can help you decide which screenings to have. They can also explain what the results might mean.   Answering questions - The physical is a good time to ask the doctor or nurse questions about your health. If needed, they can refer you to other doctors or specialists, too.  Adults older than 65 years often need other care, too. As you get older, your doctor will talk to you about:   How to prevent falling at " home   Hearing or vision tests   Memory testing   How to take your medicines safely   Making sure that you have the help and support you need at home  All topics are updated as new evidence becomes available and our peer review process is complete.  This topic retrieved from [employer] on: May 02, 2024.  Topic 500925 Version 1.0  Release: 32.4.3 - C32.122  © 2024 UpToDate, Inc. and/or its affiliates. All rights reserved.  Consumer Information Use and Disclaimer   Disclaimer: This generalized information is a limited summary of diagnosis, treatment, and/or medication information. It is not meant to be comprehensive and should be used as a tool to help the user understand and/or assess potential diagnostic and treatment options. It does NOT include all information about conditions, treatments, medications, side effects, or risks that may apply to a specific patient. It is not intended to be medical advice or a substitute for the medical advice, diagnosis, or treatment of a health care provider based on the health care provider's examination and assessment of a patient's specific and unique circumstances. Patients must speak with a health care provider for complete information about their health, medical questions, and treatment options, including any risks or benefits regarding use of medications. This information does not endorse any treatments or medications as safe, effective, or approved for treating a specific patient. UpToDate, Inc. and its affiliates disclaim any warranty or liability relating to this information or the use thereof.The use of this information is governed by the Terms of Use, available at https://www.[employer].com/en/know/clinical-effectiveness-terms. 2024© UpToDate, Inc. and its affiliates and/or licensors. All rights reserved.  Copyright   © 2024 UpToDate, Inc. and/or its affiliates. All rights reserved.

## 2025-06-25 ENCOUNTER — OFFICE VISIT (OUTPATIENT)
Dept: SURGERY | Facility: CLINIC | Age: 20
End: 2025-06-25

## 2025-06-25 VITALS
TEMPERATURE: 97.5 F | WEIGHT: 208 LBS | BODY MASS INDEX: 28.17 KG/M2 | OXYGEN SATURATION: 97 % | DIASTOLIC BLOOD PRESSURE: 82 MMHG | RESPIRATION RATE: 16 BRPM | HEART RATE: 96 BPM | SYSTOLIC BLOOD PRESSURE: 145 MMHG | HEIGHT: 72 IN

## 2025-06-25 DIAGNOSIS — L05.91 PILONIDAL CYST: Primary | ICD-10-CM

## 2025-06-25 PROCEDURE — 99024 POSTOP FOLLOW-UP VISIT: CPT | Performed by: STUDENT IN AN ORGANIZED HEALTH CARE EDUCATION/TRAINING PROGRAM

## 2025-06-25 RX ORDER — TRIAMCINOLONE ACETONIDE 1 MG/G
OINTMENT TOPICAL
COMMUNITY
Start: 2025-06-23

## 2025-06-25 NOTE — PATIENT INSTRUCTIONS
08-Jun-2019 07:00 THANK YOU for your confidence in our team.     Our Patients Are Important.   We want to improve, and you can help.   You may receive a survey asking you about your visit.   We would appreciate if you would take a few moments to let us know how we are doing.

## 2025-06-25 NOTE — PROGRESS NOTES
Name: Dave Estrada      : 2005      MRN: [id number]  Encounter Provider: Sam Melendez DO  Encounter Date: 2025   Encounter department: [facility]  :  Assessment & Plan  Pilonidal cyst  20-year-old male status post excision of pilonidal cyst on 2025  -See HPI  -Superior portion of the incision well-healed, inferior portion is healing well with a wider scar, pinpoint area with a small amount of serous drainage  -Discussed with patient he most likely had a seroma drained and his incision is now healing well  - Continue a dressing over the area as needed  -Follow-up in office in 4 weeks for wound check               History of Present Illness   HPI  Dave Estrada is a 20 y.o. male who presents for evaluation status post excision of pilonidal cyst.  He denies any pain.  He does note some drainage and blood from the inferior portion of the incision.  This has slowed down over time and he states he has been having minimal drainage now.  History obtained from: patient    Review of Systems   Constitutional:  Negative for chills, fatigue and fever.   HENT:  Negative for congestion, hearing loss, rhinorrhea and sore throat.    Eyes:  Negative for pain and discharge.   Respiratory:  Negative for cough, chest tightness and shortness of breath.    Cardiovascular:  Negative for chest pain and palpitations.   Gastrointestinal:  Negative for abdominal pain, constipation, diarrhea, nausea and vomiting.   Endocrine: Negative for cold intolerance and heat intolerance.   Genitourinary:  Negative for difficulty urinating and dysuria.   Musculoskeletal:  Negative for back pain and neck pain.   Skin:  Negative for color change and rash.   Allergic/Immunologic: Negative for environmental allergies and food allergies.   Neurological:  Negative for seizures and headaches.   Hematological:  Does not bruise/bleed easily.   Psychiatric/Behavioral:  Negative for confusion and hallucinations.       Medical History Reviewed by provider this encounter:  Tobacco  Allergies  Meds  Problems  Med Hx  Surg Hx  Fam Hx     .  Past Medical History   Past Medical History[1]  Past Surgical History[2]  Family History[3]   reports that he has quit smoking. His smoking use included cigarettes, pipe, and cigars. He has a 4 pack-year smoking history. He has been exposed to tobacco smoke. He has quit using smokeless tobacco.  His smokeless tobacco use included snuff. He reports that he does not currently use alcohol after a past usage of about 2.0 standard drinks of alcohol per week. He reports that he does not currently use drugs after having used the following drugs: Marijuana.  Current Outpatient Medications   Medication Instructions    ARIPiprazole (ABILIFY) 5 mg, Daily at bedtime    busPIRone (BUSPAR) 30 mg, 2 times daily    escitalopram (LEXAPRO) 20 mg, Daily    hydrOXYzine HCL (ATARAX) 25 mg, Daily PRN    lisinopril (ZESTRIL) 2.5 mg, Oral, Every evening    metoprolol succinate (TOPROL-XL) 25 mg, Oral, 2 times daily    triamcinolone (KENALOG) 0.1 % ointment    Allergies[4]   Medications Ordered Prior to Encounter[5]   Social History[6]     Objective   /82 (BP Location: Right arm, Patient Position: Sitting, Cuff Size: Standard)   Pulse 96   Temp 97.5 °F (36.4 °C) (Temporal)   Resp 16   Ht 6' (1.829 m)   Wt 94.3 kg (208 lb)   SpO2 97%   BMI 28.21 kg/m²      Physical Exam  Constitutional:       Appearance: Normal appearance.   HENT:      Head: Normocephalic and atraumatic.      Nose: Nose normal.     Eyes:      General: No scleral icterus.     Conjunctiva/sclera: Conjunctivae normal.       Cardiovascular:      Rate and Rhythm: Normal rate.   Pulmonary:      Effort: Pulmonary effort is normal.     Musculoskeletal:         General: No signs of injury.     Skin:     General: Skin is warm.      Coloration: Skin is not jaundiced.      Comments: Superior portion of the incision well-healed, inferior portion  is healing well with a wider scar, pinpoint area with a small amount of serous drainage     Neurological:      General: No focal deficit present.      Mental Status: He is alert and oriented to person, place, and time.     Psychiatric:         Mood and Affect: Mood normal.         Behavior: Behavior normal.                  [1]   Past Medical History:  Diagnosis Date    Allergic     Anxiety     Depression     Hypertension    [2]   Past Surgical History:  Procedure Laterality Date    CARDIAC CATHETERIZATION Left 10/11/2022    Procedure: Cardiac Left Heart Cath;  Surgeon: Kj Castellanos MD;  Location: [facility];  Service: Cardiology    CARDIAC CATHETERIZATION N/A 10/11/2022    Procedure: Cardiac Coronary Angiogram;  Surgeon: Kj Castellanos MD;  Location: [facility];  Service: Cardiology    MN EXCISION PILONIDAL CYST/SINUS SIMPLE N/A 5/14/2025    Procedure: EXCISION PILONIDAL CYST;  Surgeon: Sam Melendez DO;  Location: [facility];  Service: General    WISDOM TOOTH EXTRACTION     [3] No family history on file.  [4]   Allergies  Allergen Reactions    Bactrim [Sulfamethoxazole-Trimethoprim] Rash   [5]   Current Outpatient Medications on File Prior to Visit   Medication Sig Dispense Refill    ARIPiprazole (ABILIFY) 5 mg tablet Take 5 mg by mouth daily at bedtime      busPIRone (BUSPAR) 30 MG tablet Take 30 mg by mouth in the morning and 30 mg in the evening.      escitalopram (LEXAPRO) 20 mg tablet Take 20 mg by mouth in the morning.      hydrOXYzine HCL (ATARAX) 25 mg tablet Take 25 mg by mouth daily as needed      lisinopril (ZESTRIL) 2.5 mg tablet Take 1 tablet (2.5 mg total) by mouth every evening 90 tablet 1    metoprolol succinate (TOPROL-XL) 25 mg 24 hr tablet Take 1 tablet (25 mg total) by mouth 2 (two) times a day 180 tablet 2    triamcinolone (KENALOG) 0.1 % ointment        No current facility-administered medications on file prior to visit.   [6]   Social History  Tobacco Use     Smoking status: Former     Current packs/day: 1.00     Average packs/day: 1 pack/day for 4.0 years (4.0 ttl pk-yrs)     Types: Cigarettes, Pipe, Cigars     Passive exposure: Past    Smokeless tobacco: Former     Types: Snuff   Vaping Use    Vaping status: Former    Substances: Nicotine   Substance and Sexual Activity    Alcohol use: Not Currently     Alcohol/week: 2.0 standard drinks of alcohol     Types: 2 Standard drinks or equivalent per week     Comment: rarely    Drug use: Not Currently     Types: Marijuana    Sexual activity: Not Currently     Partners: Female     Birth control/protection: Condom Male

## 2025-06-25 NOTE — ASSESSMENT & PLAN NOTE
20-year-old male status post excision of pilonidal cyst on 5/14/2025  -See HPI  -Superior portion of the incision well-healed, inferior portion is healing well with a wider scar, pinpoint area with a small amount of serous drainage  -Discussed with patient he most likely had a seroma drained and his incision is now healing well  - Continue a dressing over the area as needed  -Follow-up in office in 4 weeks for wound check

## 2025-07-23 ENCOUNTER — OFFICE VISIT (OUTPATIENT)
Dept: SURGERY | Facility: CLINIC | Age: 20
End: 2025-07-23

## 2025-07-23 VITALS
TEMPERATURE: 97.5 F | HEIGHT: 72 IN | RESPIRATION RATE: 16 BRPM | OXYGEN SATURATION: 98 % | BODY MASS INDEX: 27.77 KG/M2 | WEIGHT: 205 LBS | DIASTOLIC BLOOD PRESSURE: 80 MMHG | HEART RATE: 96 BPM | SYSTOLIC BLOOD PRESSURE: 120 MMHG

## 2025-07-23 DIAGNOSIS — L05.91 PILONIDAL CYST: Primary | ICD-10-CM

## 2025-07-23 PROCEDURE — 99024 POSTOP FOLLOW-UP VISIT: CPT | Performed by: STUDENT IN AN ORGANIZED HEALTH CARE EDUCATION/TRAINING PROGRAM

## 2025-07-23 NOTE — LETTER
2025     Deny Vallejo MD  [address] [facility] [place] [address]    Patient: Dave Estrada   YOB: 2005   Date of Visit: 2025       Dear Dr. Deny Vallejo MD:    Thank you for referring Dave Estrada to me for evaluation. Below are my notes for this consultation.    If you have questions, please do not hesitate to call me. I look forward to following your patient along with you.         Sincerely,        Sam Melendez DO        CC: No Recipients    Sam Melendez DO  2025 12:05 PM  Signed  Name: Dave Estrada      : 2005      MRN: [id number]  Encounter Provider: Sam Melendez DO  Encounter Date: 2025   Encounter department: [facility]  :  Assessment & Plan  Pilonidal cyst  20-year-old male status post excision of pilonidal cyst on 2025  -See HPI  -Inferior portion of the incision is well-healed, small opening in the middle of the incision with good granulation tissue and serous drainage, small opening at the superior portion of the incision draining some cloudy serous fluid, no signs of erythema, no concerns for infection at this time  - Continue a dressing over the area as needed to collect drainage  -Follow-up in office in 4 weeks for wound check               History of Present Illness  HPI  Dave Estrada is a 20 y.o. male who presents for evaluation status post excision of pilonidal cyst.  Patient was stretching the other day and did notice some bleeding from the wound.  He denies any pain.  He notes that there has been some continued drainage but it is more cloudy fluid.  History obtained from: patient    Review of Systems   Constitutional:  Negative for chills, fatigue and fever.   HENT:  Negative for congestion, hearing loss, rhinorrhea and sore throat.    Eyes:  Negative for pain and discharge.   Respiratory:  Negative for cough, chest tightness and shortness of breath.    Cardiovascular:   Negative for chest pain and palpitations.   Gastrointestinal:  Negative for abdominal pain, constipation, diarrhea, nausea and vomiting.   Endocrine: Negative for cold intolerance and heat intolerance.   Genitourinary:  Negative for difficulty urinating and dysuria.   Musculoskeletal:  Negative for back pain and neck pain.   Skin:  Negative for color change and rash.   Allergic/Immunologic: Negative for environmental allergies and food allergies.   Neurological:  Negative for seizures and headaches.   Hematological:  Does not bruise/bleed easily.   Psychiatric/Behavioral:  Negative for confusion and hallucinations.      Medical History Reviewed by provider this encounter:     .  Past Medical History  Past Medical History[1]  Past Surgical History[2]  Family History[3]   reports that he has quit smoking. His smoking use included cigarettes, pipe, and cigars. He has a 4 pack-year smoking history. He has been exposed to tobacco smoke. He has quit using smokeless tobacco.  His smokeless tobacco use included snuff. He reports that he does not currently use alcohol after a past usage of about 2.0 standard drinks of alcohol per week. He reports that he does not currently use drugs after having used the following drugs: Marijuana.  Current Outpatient Medications   Medication Instructions   • ARIPiprazole (ABILIFY) 5 mg, Daily at bedtime   • busPIRone (BUSPAR) 30 mg, 2 times daily   • escitalopram (LEXAPRO) 20 mg, Daily   • hydrOXYzine HCL (ATARAX) 25 mg, Daily PRN   • lisinopril (ZESTRIL) 2.5 mg, Oral, Every evening   • metoprolol succinate (TOPROL-XL) 25 mg, Oral, 2 times daily   • triamcinolone (KENALOG) 0.1 % ointment    Allergies[4]   Medications Ordered Prior to Encounter[5]   Social History[6]     Objective  /80 (BP Location: Left arm, Patient Position: Sitting, Cuff Size: Standard)   Pulse 96   Temp 97.5 °F (36.4 °C) (Temporal)   Resp 16   Ht 6' (1.829 m)   Wt 93 kg (205 lb)   SpO2 98%   BMI 27.80 kg/m²       Physical Exam  Constitutional:       Appearance: Normal appearance.   HENT:      Head: Normocephalic and atraumatic.      Nose: Nose normal.     Eyes:      General: No scleral icterus.     Conjunctiva/sclera: Conjunctivae normal.       Cardiovascular:      Rate and Rhythm: Normal rate.   Pulmonary:      Effort: Pulmonary effort is normal.     Musculoskeletal:         General: No signs of injury.     Skin:     General: Skin is warm.      Coloration: Skin is not jaundiced.      Comments: nferior portion of the incision is well-healed, small opening in the middle of the incision with good granulation tissue and serous drainage, small opening at the superior portion of the incision draining some cloudy serous fluid, no signs of erythema, no concerns for infection at this time     Neurological:      General: No focal deficit present.      Mental Status: He is alert and oriented to person, place, and time.     Psychiatric:         Mood and Affect: Mood normal.         Behavior: Behavior normal.                [1]   Past Medical History:  Diagnosis Date   • Allergic    • Anxiety    • Depression    • Hypertension    [2]   Past Surgical History:  Procedure Laterality Date   • CARDIAC CATHETERIZATION Left 10/11/2022    Procedure: Cardiac Left Heart Cath;  Surgeon: Kj Castellanos MD;  Location: [facility];  Service: Cardiology   • CARDIAC CATHETERIZATION N/A 10/11/2022    Procedure: Cardiac Coronary Angiogram;  Surgeon: Kj Castellanos MD;  Location: [facility];  Service: Cardiology   • [place] EXCISION PILONIDAL CYST/SINUS SIMPLE N/A 5/14/2025    Procedure: EXCISION PILONIDAL CYST;  Surgeon: Sam Melendez DO;  Location: [facility];  Service: General   • WISDOM TOOTH EXTRACTION     [3] No family history on file.  [4]   Allergies  Allergen Reactions   • Bactrim [Sulfamethoxazole-Trimethoprim] Rash   [5]   Current Outpatient Medications on File Prior to Visit   Medication Sig Dispense Refill   •  ARIPiprazole (ABILIFY) 5 mg tablet Take 5 mg by mouth daily at bedtime     • busPIRone (BUSPAR) 30 MG tablet Take 30 mg by mouth in the morning and 30 mg in the evening.     • escitalopram (LEXAPRO) 20 mg tablet Take 20 mg by mouth in the morning.     • hydrOXYzine HCL (ATARAX) 25 mg tablet Take 25 mg by mouth daily as needed     • lisinopril (ZESTRIL) 2.5 mg tablet Take 1 tablet (2.5 mg total) by mouth every evening 90 tablet 1   • metoprolol succinate (TOPROL-XL) 25 mg 24 hr tablet Take 1 tablet (25 mg total) by mouth 2 (two) times a day 180 tablet 2   • triamcinolone (KENALOG) 0.1 % ointment  (Patient not taking: Reported on 7/23/2025)       No current facility-administered medications on file prior to visit.   [6]   Social History  Tobacco Use   • Smoking status: Former     Current packs/day: 1.00     Average packs/day: 1 pack/day for 4.0 years (4.0 ttl pk-yrs)     Types: Cigarettes, Pipe, Cigars     Passive exposure: Past   • Smokeless tobacco: Former     Types: Snuff   Vaping Use   • Vaping status: Former   • Substances: Nicotine   Substance and Sexual Activity   • Alcohol use: Not Currently     Alcohol/week: 2.0 standard drinks of alcohol     Types: 2 Standard drinks or equivalent per week     Comment: rarely   • Drug use: Not Currently     Types: Marijuana   • Sexual activity: Not Currently     Partners: Female     Birth control/protection: Condom Male

## 2025-07-23 NOTE — PROGRESS NOTES
Name: Dave Estrada      : 2005      MRN: [id number]  Encounter Provider: Sam Melendez DO  Encounter Date: 2025   Encounter department: [facility]  :  Assessment & Plan  Pilonidal cyst  20-year-old male status post excision of pilonidal cyst on 2025  -See HPI  -Inferior portion of the incision is well-healed, small opening in the middle of the incision with good granulation tissue and serous drainage, small opening at the superior portion of the incision draining some cloudy serous fluid, no signs of erythema, no concerns for infection at this time  - Continue a dressing over the area as needed to collect drainage  -Follow-up in office in 4 weeks for wound check               History of Present Illness   HPI  Dave Estrada is a 20 y.o. male who presents for evaluation status post excision of pilonidal cyst.  Patient was stretching the other day and did notice some bleeding from the wound.  He denies any pain.  He notes that there has been some continued drainage but it is more cloudy fluid.  History obtained from: patient    Review of Systems   Constitutional:  Negative for chills, fatigue and fever.   HENT:  Negative for congestion, hearing loss, rhinorrhea and sore throat.    Eyes:  Negative for pain and discharge.   Respiratory:  Negative for cough, chest tightness and shortness of breath.    Cardiovascular:  Negative for chest pain and palpitations.   Gastrointestinal:  Negative for abdominal pain, constipation, diarrhea, nausea and vomiting.   Endocrine: Negative for cold intolerance and heat intolerance.   Genitourinary:  Negative for difficulty urinating and dysuria.   Musculoskeletal:  Negative for back pain and neck pain.   Skin:  Negative for color change and rash.   Allergic/Immunologic: Negative for environmental allergies and food allergies.   Neurological:  Negative for seizures and headaches.   Hematological:  Does not bruise/bleed easily.    Psychiatric/Behavioral:  Negative for confusion and hallucinations.      Medical History Reviewed by provider this encounter:     .  Past Medical History   Past Medical History[1]  Past Surgical History[2]  Family History[3]   reports that he has quit smoking. His smoking use included cigarettes, pipe, and cigars. He has a 4 pack-year smoking history. He has been exposed to tobacco smoke. He has quit using smokeless tobacco.  His smokeless tobacco use included snuff. He reports that he does not currently use alcohol after a past usage of about 2.0 standard drinks of alcohol per week. He reports that he does not currently use drugs after having used the following drugs: Marijuana.  Current Outpatient Medications   Medication Instructions    ARIPiprazole (ABILIFY) 5 mg, Daily at bedtime    busPIRone (BUSPAR) 30 mg, 2 times daily    escitalopram (LEXAPRO) 20 mg, Daily    hydrOXYzine HCL (ATARAX) 25 mg, Daily PRN    lisinopril (ZESTRIL) 2.5 mg, Oral, Every evening    metoprolol succinate (TOPROL-XL) 25 mg, Oral, 2 times daily    triamcinolone (KENALOG) 0.1 % ointment    Allergies[4]   Medications Ordered Prior to Encounter[5]   Social History[6]     Objective   /80 (BP Location: Left arm, Patient Position: Sitting, Cuff Size: Standard)   Pulse 96   Temp 97.5 °F (36.4 °C) (Temporal)   Resp 16   Ht 6' (1.829 m)   Wt 93 kg (205 lb)   SpO2 98%   BMI 27.80 kg/m²      Physical Exam  Constitutional:       Appearance: Normal appearance.   HENT:      Head: Normocephalic and atraumatic.      Nose: Nose normal.     Eyes:      General: No scleral icterus.     Conjunctiva/sclera: Conjunctivae normal.       Cardiovascular:      Rate and Rhythm: Normal rate.   Pulmonary:      Effort: Pulmonary effort is normal.     Musculoskeletal:         General: No signs of injury.     Skin:     General: Skin is warm.      Coloration: Skin is not jaundiced.      Comments: nferior portion of the incision is well-healed, small opening  in the middle of the incision with good granulation tissue and serous drainage, small opening at the superior portion of the incision draining some cloudy serous fluid, no signs of erythema, no concerns for infection at this time     Neurological:      General: No focal deficit present.      Mental Status: He is alert and oriented to person, place, and time.     Psychiatric:         Mood and Affect: Mood normal.         Behavior: Behavior normal.                [1]   Past Medical History:  Diagnosis Date    Allergic     Anxiety     Depression     Hypertension    [2]   Past Surgical History:  Procedure Laterality Date    CARDIAC CATHETERIZATION Left 10/11/2022    Procedure: Cardiac Left Heart Cath;  Surgeon: Kj Castellanos MD;  Location: [facility];  Service: Cardiology    CARDIAC CATHETERIZATION N/A 10/11/2022    Procedure: Cardiac Coronary Angiogram;  Surgeon: Kj Castellanos MD;  Location: [facility];  Service: Cardiology    [place] EXCISION PILONIDAL CYST/SINUS SIMPLE N/A 5/14/2025    Procedure: EXCISION PILONIDAL CYST;  Surgeon: Sam Melendez DO;  Location: [facility];  Service: General    WISDOM TOOTH EXTRACTION     [3] No family history on file.  [4]   Allergies  Allergen Reactions    Bactrim [Sulfamethoxazole-Trimethoprim] Rash   [5]   Current Outpatient Medications on File Prior to Visit   Medication Sig Dispense Refill    ARIPiprazole (ABILIFY) 5 mg tablet Take 5 mg by mouth daily at bedtime      busPIRone (BUSPAR) 30 MG tablet Take 30 mg by mouth in the morning and 30 mg in the evening.      escitalopram (LEXAPRO) 20 mg tablet Take 20 mg by mouth in the morning.      hydrOXYzine HCL (ATARAX) 25 mg tablet Take 25 mg by mouth daily as needed      lisinopril (ZESTRIL) 2.5 mg tablet Take 1 tablet (2.5 mg total) by mouth every evening 90 tablet 1    metoprolol succinate (TOPROL-XL) 25 mg 24 hr tablet Take 1 tablet (25 mg total) by mouth 2 (two) times a day 180 tablet 2    triamcinolone  (KENALOG) 0.1 % ointment  (Patient not taking: Reported on 7/23/2025)       No current facility-administered medications on file prior to visit.   [6]   Social History  Tobacco Use    Smoking status: Former     Current packs/day: 1.00     Average packs/day: 1 pack/day for 4.0 years (4.0 ttl pk-yrs)     Types: Cigarettes, Pipe, Cigars     Passive exposure: Past    Smokeless tobacco: Former     Types: Snuff   Vaping Use    Vaping status: Former    Substances: Nicotine   Substance and Sexual Activity    Alcohol use: Not Currently     Alcohol/week: 2.0 standard drinks of alcohol     Types: 2 Standard drinks or equivalent per week     Comment: rarely    Drug use: Not Currently     Types: Marijuana    Sexual activity: Not Currently     Partners: Female     Birth control/protection: Condom Male

## 2025-07-23 NOTE — ASSESSMENT & PLAN NOTE
20-year-old male status post excision of pilonidal cyst on 5/14/2025  -See HPI  -Inferior portion of the incision is well-healed, small opening in the middle of the incision with good granulation tissue and serous drainage, small opening at the superior portion of the incision draining some cloudy serous fluid, no signs of erythema, no concerns for infection at this time  - Continue a dressing over the area as needed to collect drainage  -Follow-up in office in 4 weeks for wound check

## 2025-08-07 ENCOUNTER — NURSE TRIAGE (OUTPATIENT)
Age: 20
End: 2025-08-07

## (undated) DEVICE — SUT ETHILON 2-0 PS 18 IN 585H

## (undated) DEVICE — CATH DIAG 5FR IMPULSE 100CM FR4

## (undated) DEVICE — ANTIBACTERIAL UNDYED BRAIDED (POLYGLACTIN 910), SYNTHETIC ABSORBABLE SUTURE: Brand: COATED VICRYL

## (undated) DEVICE — RADIFOCUS OPTITORQUE ANGIOGRAPHIC CATHETER: Brand: OPTITORQUE

## (undated) DEVICE — TELFA NON-ADHERENT ABSORBENT DRESSING: Brand: TELFA

## (undated) DEVICE — DRAPE RADPAD PERIPHERAL SHIELD 11 X 34IN YELLOW

## (undated) DEVICE — INTENDED FOR TISSUE SEPARATION, AND OTHER PROCEDURES THAT REQUIRE A SHARP SURGICAL BLADE TO PUNCTURE OR CUT.: Brand: BARD-PARKER SAFETY BLADES SIZE 15, STERILE

## (undated) DEVICE — GLOVE INDICATOR PI UNDERGLOVE SZ 7 BLUE

## (undated) DEVICE — TUBING SUCTION 5MM X 12 FT

## (undated) DEVICE — NEEDLE 23G X 1 1/2 SAFETY-GLIDE THIN WALL

## (undated) DEVICE — GLOVE SRG BIOGEL 7

## (undated) DEVICE — IV CATH 18 G X 1.16 IN

## (undated) DEVICE — DGW .035 FC J3MM 260CM TEF: Brand: EMERALD

## (undated) DEVICE — GLIDESHEATH SLENDER STAINLESS STEEL KIT: Brand: GLIDESHEATH SLENDER

## (undated) DEVICE — SPONGE STICK WITH PVP-I: Brand: KENDALL

## (undated) DEVICE — BETHLEHEM UNIVERSAL MINOR GEN: Brand: CARDINAL HEALTH

## (undated) DEVICE — DRAPE EQUIPMENT RF WAND

## (undated) DEVICE — TR BAND RADIAL ARTERY COMPRESSION DEVICE: Brand: TR BAND

## (undated) DEVICE — 4-PORT MANIFOLD: Brand: NEPTUNE 2

## (undated) DEVICE — PENCILETTE SMOKE EVAC PUSH BUTTON COATED

## (undated) DEVICE — POOLE SUCTION HANDLE: Brand: CARDINAL HEALTH

## (undated) DEVICE — SCD SEQUENTIAL COMPRESSION COMFORT SLEEVE MEDIUM KNEE LENGTH: Brand: KENDALL SCD

## (undated) DEVICE — DRESSING MEPORE FILM ADHESIVE 4 X 5IN

## (undated) DEVICE — GAUZE SPONGES,16 PLY: Brand: CURITY

## (undated) DEVICE — REM POLYHESIVE ADULT PATIENT RETURN ELECTRODE: Brand: VALLEYLAB